# Patient Record
Sex: FEMALE | Race: WHITE | NOT HISPANIC OR LATINO | Employment: OTHER | URBAN - METROPOLITAN AREA
[De-identification: names, ages, dates, MRNs, and addresses within clinical notes are randomized per-mention and may not be internally consistent; named-entity substitution may affect disease eponyms.]

---

## 2019-01-08 ENCOUNTER — TRANSCRIBE ORDERS (OUTPATIENT)
Dept: ADMINISTRATIVE | Facility: HOSPITAL | Age: 74
End: 2019-01-08

## 2019-01-08 DIAGNOSIS — D41.02 NEOPLASM OF UNCERTAIN BEHAVIOR OF LEFT KIDNEY: Primary | ICD-10-CM

## 2021-12-14 ENCOUNTER — APPOINTMENT (OUTPATIENT)
Dept: RADIOLOGY | Facility: AMBULARY SURGERY CENTER | Age: 76
End: 2021-12-14
Attending: ORTHOPAEDIC SURGERY
Payer: COMMERCIAL

## 2021-12-14 ENCOUNTER — OFFICE VISIT (OUTPATIENT)
Dept: OBGYN CLINIC | Facility: CLINIC | Age: 76
End: 2021-12-14
Payer: COMMERCIAL

## 2021-12-14 VITALS — DIASTOLIC BLOOD PRESSURE: 82 MMHG | HEART RATE: 76 BPM | SYSTOLIC BLOOD PRESSURE: 151 MMHG

## 2021-12-14 DIAGNOSIS — M25.572 PAIN, JOINT, ANKLE AND FOOT, LEFT: ICD-10-CM

## 2021-12-14 DIAGNOSIS — S82.52XD: Primary | ICD-10-CM

## 2021-12-14 PROCEDURE — 73610 X-RAY EXAM OF ANKLE: CPT

## 2021-12-14 PROCEDURE — 99203 OFFICE O/P NEW LOW 30 MIN: CPT | Performed by: ORTHOPAEDIC SURGERY

## 2021-12-14 RX ORDER — ACETAMINOPHEN 325 MG/1
325 TABLET ORAL EVERY 6 HOURS PRN
COMMUNITY

## 2021-12-14 RX ORDER — TRAMADOL HYDROCHLORIDE 50 MG/1
50 TABLET ORAL EVERY 6 HOURS PRN
COMMUNITY

## 2021-12-14 RX ORDER — NICOTINE 21 MG/24HR
1 PATCH, TRANSDERMAL 24 HOURS TRANSDERMAL EVERY 24 HOURS
COMMUNITY

## 2021-12-14 RX ORDER — LEVOTHYROXINE SODIUM 0.15 MG/1
150 TABLET ORAL DAILY
COMMUNITY

## 2021-12-14 RX ORDER — ATORVASTATIN CALCIUM 40 MG/1
40 TABLET, FILM COATED ORAL DAILY
COMMUNITY

## 2022-01-07 ENCOUNTER — APPOINTMENT (OUTPATIENT)
Dept: RADIOLOGY | Facility: AMBULARY SURGERY CENTER | Age: 77
End: 2022-01-07
Attending: ORTHOPAEDIC SURGERY
Payer: COMMERCIAL

## 2022-01-07 ENCOUNTER — APPOINTMENT (OUTPATIENT)
Dept: RADIOLOGY | Facility: CLINIC | Age: 77
End: 2022-01-07
Payer: COMMERCIAL

## 2022-01-07 ENCOUNTER — OFFICE VISIT (OUTPATIENT)
Dept: OBGYN CLINIC | Facility: CLINIC | Age: 77
End: 2022-01-07
Payer: COMMERCIAL

## 2022-01-07 DIAGNOSIS — S82.52XD: ICD-10-CM

## 2022-01-07 DIAGNOSIS — S82.52XD: Primary | ICD-10-CM

## 2022-01-07 PROCEDURE — 99213 OFFICE O/P EST LOW 20 MIN: CPT | Performed by: ORTHOPAEDIC SURGERY

## 2022-01-07 PROCEDURE — 73610 X-RAY EXAM OF ANKLE: CPT

## 2022-01-07 NOTE — PATIENT INSTRUCTIONS
Then 2 weeks of weightbearing as tolerated in the CAM boot  You may begin weaning your boot and transitioning to a sneaker (1/21/22)  It is important to do this gradually to avoid aggravating the healing process  1  1/21/22, you may come out of the boot into a sneaker for 2 hours  2  1/22/22, you may come out of the boot into a sneaker for 4 hours,  3  The next day, you may come out of the boot into a sneaker for 6 hours  4  Continue this (adding 2 hours per day) as you tolerate  For example, if you do 6 hours out of the boot into a sneaker and your foot swells more than usual at night and it is difficult to control the discomfort, do not advance to 8 hours the next day, stay at 6 hours until you are able to tolerate it  Elevation, Ice and tylenol and staying off of it at night will be important to aide in this transition out of the boot  Swelling and soreness are normal as you begin to do more with the injured leg  No change in Eliquis due to being on it for her pacemaker  May shower, do not soak in a tub/pool/ocean/etc for another 4 weeks  Compression stocking (Knee high, 20-30mm Hg) to be worn at all times while awake  Recommend taking the following supplements: Vitamin D 4000 units per day and Calcium 1200 mg per day  This will help with bone healing

## 2022-01-07 NOTE — PROGRESS NOTES
MARGARITO Patrick  Attending, Orthopaedic Surgery  Foot and [address] [place] [address] FOOT AND ANKLE CLINIC VISIT     Assessment:     Encounter Diagnosis   Name Primary?  Traumatic closed displaced fracture of medial malleolus with routine healing, left Yes            Plan:   · The patient verbalized understanding of exam findings and treatment plan  We engaged in the shared decision-making process and treatment options were discussed at length with the patient  Surgical and conservative management discussed today along with risks and benefits  · Her Xrays are stable  She is not having any pain in the ankle  · Cast removed  · PT to begin immediately  · WBAT in the CAM boot provided today  · She will use the boot for 3 weeks and then wean the boot over the 4th week  Return in about 6 weeks (around 2/18/2022) for left ankle  History of Present Illness:   Chief Complaint:   Chief Complaint   Patient presents with    Left Ankle - Fracture     Huang Lopez is a 64 y o  female who is being seen in follow-up for left ankle fracture, 12/12/21  She had a stroke and rolled her left ankle, inversion injury  She has a stroke in 2020 and has weakness in the left side  When we last saw she we recommended short-leg cast, cast shoe with the use of the walker   Pain has partially improved  Residual pain is localized at medial with minimal radiating and described as sharp and severe  Pain/symptom timing:  Worse during the day when active  Pain/symptom context:  Worse with activites and work  Pain/symptom modifying factors:  Rest makes better, activities make worse  Pain/symptom associated signs/symptoms: none    Prior treatment   · NSAIDsNo   · Injections No   · Bracing/Orthotics Yes    · Physical Therapy No     Orthopedic Surgical History:   See below    Past Medical, Surgical and Social History:  Past Medical History:  has no past medical history on file    Problem List: does not have any pertinent problems on file  Past Surgical History:  has no past surgical history on file  Family History: family history is not on file  Social History:  reports that she has quit smoking  She has never used smokeless tobacco  No history on file for alcohol use and drug use  Current Medications: has a current medication list which includes the following prescription(s): acetaminophen, apixaban, atorvastatin, levothyroxine, nicotine, and tramadol  Allergies: has No Known Allergies  Review of Systems:  General- denies fever/chills  HEENT- denies hearing loss or sore throat  Eyes- denies eye pain or visual disturbances, denies red eyes  Respiratory- denies cough or SOB  Cardio- denies chest pain or palpitations  GI- denies abdominal pain  Endocrine- denies urinary frequency  Urinary- denies pain with urination  Musculoskeletal- Negative except noted above  Skin- denies rashes or wounds  Neurological- denies dizziness or headache  Psychiatric- denies anxiety or difficulty concentrating    Physical Exam:   There were no vitals taken for this visit  General/Constitutional: No apparent distress: well-nourished and well developed  Eyes: normal ocular motion  Lymphatic: No appreciable lymphadenopathy  Respiratory: Non-labored breathing  Vascular: No edema, swelling or tenderness, except as noted in detailed exam   Integumentary: No impressive skin lesions present, except as noted in detailed exam   Neuro: No ataxia or tremors noted  Psych: Normal mood and affect, oriented to person, place and time  Appropriate affect  Musculoskeletal: Normal, except as noted in detailed exam and in HPI      Examination    Left    Gait In wheelchair   Musculoskeletal Tender to palpation at medial malleolus     Skin Normal       Nails Normal    Range of Motion  20 degrees dorsiflexion, 30 degrees plantarflexion  Subtalar motion: normal    Stability Stable    Muscle Strength 5/5 tibialis anterior  5/5 gastrocnemius-soleus  5/5 posterior tibialis  5/5 peroneal/eversion strength  5/5 EHL  5/5 FHL    Neurologic Normal    Sensation Intact to light touch throughout sural, saphenous, superficial peroneal, deep peroneal and medial/lateral plantar nerve distributions  [place]-Dennis 5 07 filament (10g) testing deferred  Cardiovascular Brisk capillary refill < 2 seconds,intact DP and PT pulses    Special Tests None      Imaging Studies:       3 views of the Left ankle were obtained, reviewed and interpreted independently which demonstrate medial malleolus fracture stable without interval displacement  Reviewed by me personally  Scribe Attestation    I,:  Leyda Day am acting as a scribe while in the presence of the attending physician :       I,:  Sudhir Adkins MD personally performed the services described in this documentation    as scribed in my presence :           Thersa Quay Lachman, MD  Foot & Ankle Surgery   Department [address]      I personally performed the service  Thersa Quay Lachman, MD

## 2022-02-25 ENCOUNTER — APPOINTMENT (OUTPATIENT)
Dept: RADIOLOGY | Facility: CLINIC | Age: 77
End: 2022-02-25
Payer: COMMERCIAL

## 2022-02-25 ENCOUNTER — OFFICE VISIT (OUTPATIENT)
Dept: OBGYN CLINIC | Facility: CLINIC | Age: 77
End: 2022-02-25
Payer: COMMERCIAL

## 2022-02-25 VITALS
BODY MASS INDEX: 29.44 KG/M2 | HEIGHT: 62 IN | WEIGHT: 160 LBS | SYSTOLIC BLOOD PRESSURE: 130 MMHG | DIASTOLIC BLOOD PRESSURE: 82 MMHG

## 2022-02-25 DIAGNOSIS — S82.52XD: ICD-10-CM

## 2022-02-25 DIAGNOSIS — S82.52XD: Primary | ICD-10-CM

## 2022-02-25 PROCEDURE — 73610 X-RAY EXAM OF ANKLE: CPT

## 2022-02-25 PROCEDURE — 99213 OFFICE O/P EST LOW 20 MIN: CPT | Performed by: ORTHOPAEDIC SURGERY

## 2022-02-25 NOTE — PATIENT INSTRUCTIONS
MARGARITO Mejia  Attending, Orthopaedic Surgery  Foot and [address] [facility] [address] [facility]      ORTHOPAEDIC FOOT AND ANKLE CLINIC VISIT     Assessment:     Encounter Diagnosis   Name Primary?  Traumatic closed displaced fracture of medial malleolus with routine healing, left Yes            Plan:   · The patient verbalized understanding of exam findings and treatment plan  We engaged in the shared decision-making process and treatment options were discussed at length with the patient  Surgical and conservative management discussed today along with risks and benefits  · Her fracture has healed on Xray  · No restrictions from an orthopaedic standpoint  · No further imaging needed  She is out of the boot and safely into a sneaker at this point  · Continue PT until discharged by the physical therapist   Jr Rowley Return to clinic PRN      History of Present Illness:   Chief Complaint: s/p above injury    Lucy Gomez is a 64 y o  female who is being seen in follow-up for left ankle traumatic closed displaced fracture of medial malleolus 12/12/21  Last appt transitioned into CAM boot WBAT  Weaned out of the boot starting 1/21  Pain has improved  Denies any numbness or tingling in foot  Pain/symptom timing:  Worse during the day when active  Pain/symptom context:  Worse with activites and work  Pain/symptom modifying factors:  Rest makes better, activities make worse  Pain/symptom associated signs/symptoms: none    Prior treatment   · NSAIDsYes   · Injections No   · Bracing/Orthotics Yes    · Physical Therapy No     Orthopedic Surgical History:   See below     Past Medical, Surgical and Social History:  Past Medical History:  has no past medical history on file  Problem List: does not have any pertinent problems on file  Past Surgical History:  has no past surgical history on file  Family History: family history is not on file  Social History:  reports that she has quit smoking   She has never used smokeless tobacco  No history on file for alcohol use and drug use  Current Medications: has a current medication list which includes the following prescription(s): acetaminophen, apixaban, atorvastatin, levothyroxine, nicotine, and tramadol  Allergies: has No Known Allergies  Review of Systems:  General- denies fever/chills  HEENT- denies hearing loss or sore throat  Eyes- denies eye pain or visual disturbances, denies red eyes  Respiratory- denies cough or SOB  Cardio- denies chest pain or palpitations  GI- denies abdominal pain  Endocrine- denies urinary frequency  Urinary- denies pain with urination  Musculoskeletal- Negative except noted above  Skin- denies rashes or wounds  Neurological- denies dizziness or headache  Psychiatric- denies anxiety or difficulty concentrating    Physical Exam:   /82   Ht 5' 2" (1 575 m)   Wt 72 6 kg (160 lb)   BMI 29 26 kg/m²   General/Constitutional: No apparent distress: well-nourished and well developed  Eyes: normal ocular motion  Lymphatic: No appreciable lymphadenopathy  Respiratory: Non-labored breathing  Vascular: No edema, swelling or tenderness, except as noted in detailed exam   Integumentary: No impressive skin lesions present, except as noted in detailed exam   Neuro: No ataxia or tremors noted  Psych: Normal mood and affect, oriented to person, place and time  Appropriate affect  Musculoskeletal: Normal, except as noted in detailed exam and in HPI  Examination    Left    Gait Antalgic    Musculoskeletal Tender to palpation at medial malleolus     Skin Normal      Nails Normal    Range of Motion  20 degrees dorsiflexion, 40 degrees plantarflexion  Subtalar motion: normal    Stability Stable    Muscle Strength Not assessed due to stroke    Neurologic Normal    Sensation Intact to light touch throughout sural, saphenous, superficial peroneal, deep peroneal and medial/lateral plantar nerve distributions    [place]-Dennis 5 07 filament (10g) testing deferred  Cardiovascular Brisk capillary refill < 2 seconds,intact DP and PT pulses    Special Tests None      Imaging Studies:   3 views of the Left ankle were obtained, reviewed and interpreted independently which demonstrate medial malleolus fracture well healed in an anatomic position  Reviewed by me personally  Mona Smolder Lachman, MD  Foot & Ankle Surgery   Department of [address] [place] [address]      I personally performed the service  Mona Smolder Lachman, MD

## 2022-02-25 NOTE — PROGRESS NOTES
MARGARITO Sanchez  Attending, Orthopaedic Surgery  Foot and [address] [facility] [address] [facility]      ORTHOPAEDIC FOOT AND ANKLE CLINIC VISIT     Assessment:     Encounter Diagnosis   Name Primary?  Traumatic closed displaced fracture of medial malleolus with routine healing, left Yes            Plan:   · The patient verbalized understanding of exam findings and treatment plan  We engaged in the shared decision-making process and treatment options were discussed at length with the patient  Surgical and conservative management discussed today along with risks and benefits  · Her fracture has healed on Xray  · No restrictions from an orthopaedic standpoint  · No further imaging needed  She is out of the boot and safely into a sneaker at this point  · Continue PT until discharged by the physical therapist   Return to clinic PRN      History of Present Illness:   Chief Complaint: s/p above injury    Mary Nick is a 64 y o  female who is being seen in follow-up for left ankle traumatic closed displaced fracture of medial malleolus 12/12/21  Last appt transitioned into CAM boot WBAT  Weaned out of the boot starting 1/21  Pain has improved  Denies any numbness or tingling in foot  Pain/symptom timing:  Worse during the day when active  Pain/symptom context:  Worse with activites and work  Pain/symptom modifying factors:  Rest makes better, activities make worse  Pain/symptom associated signs/symptoms: none    Prior treatment   · NSAIDsYes   · Injections No   · Bracing/Orthotics Yes    · Physical Therapy No     Orthopedic Surgical History:   See below     Past Medical, Surgical and Social History:  Past Medical History:  has no past medical history on file  Problem List: does not have any pertinent problems on file  Past Surgical History:  has no past surgical history on file  Family History: family history is not on file  Social History:  reports that she has quit smoking   She has never used smokeless tobacco  No history on file for alcohol use and drug use  Current Medications: has a current medication list which includes the following prescription(s): acetaminophen, apixaban, atorvastatin, levothyroxine, nicotine, and tramadol  Allergies: has No Known Allergies  Review of Systems:  General- denies fever/chills  HEENT- denies hearing loss or sore throat  Eyes- denies eye pain or visual disturbances, denies red eyes  Respiratory- denies cough or SOB  Cardio- denies chest pain or palpitations  GI- denies abdominal pain  Endocrine- denies urinary frequency  Urinary- denies pain with urination  Musculoskeletal- Negative except noted above  Skin- denies rashes or wounds  Neurological- denies dizziness or headache  Psychiatric- denies anxiety or difficulty concentrating    Physical Exam:   /82   Ht 5' 2" (1 575 m)   Wt 72 6 kg (160 lb)   BMI 29 26 kg/m²   General/Constitutional: No apparent distress: well-nourished and well developed  Eyes: normal ocular motion  Lymphatic: No appreciable lymphadenopathy  Respiratory: Non-labored breathing  Vascular: No edema, swelling or tenderness, except as noted in detailed exam   Integumentary: No impressive skin lesions present, except as noted in detailed exam   Neuro: No ataxia or tremors noted  Psych: Normal mood and affect, oriented to person, place and time  Appropriate affect  Musculoskeletal: Normal, except as noted in detailed exam and in HPI  Examination    Left    Gait Antalgic    Musculoskeletal Tender to palpation at medial malleolus     Skin Normal      Nails Normal    Range of Motion  20 degrees dorsiflexion, 40 degrees plantarflexion  Subtalar motion: normal    Stability Stable    Muscle Strength Not assessed due to stroke    Neurologic Normal    Sensation Intact to light touch throughout sural, saphenous, superficial peroneal, deep peroneal and medial/lateral plantar nerve distributions    [place]-Dennis 5 07 filament (10g) testing deferred  Cardiovascular Brisk capillary refill < 2 seconds,intact DP and PT pulses    Special Tests None      Imaging Studies:   3 views of the Left ankle were obtained, reviewed and interpreted independently which demonstrate medial malleolus fracture well healed in an anatomic position  Reviewed by me personally  Jay Rower Lachman, MD  Foot & Ankle Surgery   Department of [address] [place] [address]      I personally performed the service  Jay Rower Lachman, MD      Scribe Attestation    I,:   am acting as a scribe while in the presence of the attending physician :       I,:   personally performed the services described in this documentation    as scribed in my presence :

## 2022-03-10 ENCOUNTER — APPOINTMENT (EMERGENCY)
Dept: RADIOLOGY | Facility: HOSPITAL | Age: 77
End: 2022-03-10
Payer: COMMERCIAL

## 2022-03-10 ENCOUNTER — HOSPITAL ENCOUNTER (EMERGENCY)
Facility: HOSPITAL | Age: 77
Discharge: HOME | End: 2022-03-11
Attending: SURGERY | Admitting: SURGERY
Payer: COMMERCIAL

## 2022-03-10 VITALS
SYSTOLIC BLOOD PRESSURE: 203 MMHG | RESPIRATION RATE: 20 BRPM | HEART RATE: 72 BPM | DIASTOLIC BLOOD PRESSURE: 90 MMHG | TEMPERATURE: 97.8 F | OXYGEN SATURATION: 98 % | WEIGHT: 165.34 LBS
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
## 2022-03-10 DIAGNOSIS — S00.03XA HEMATOMA OF SCALP, INITIAL ENCOUNTER: Primary | ICD-10-CM

## 2022-03-10 LAB
ALBUMIN SERPL BCP-MCNC: 3.3 G/DL (ref 3.5–5)
ALP SERPL-CCNC: 113 U/L (ref 46–116)
ALT SERPL W P-5'-P-CCNC: 17 U/L (ref 12–78)
ANION GAP SERPL CALCULATED.3IONS-SCNC: 8 MMOL/L (ref 4–13)
APTT PPP: 33 SECONDS (ref 23–37)
AST SERPL W P-5'-P-CCNC: 16 U/L (ref 5–45)
BASE EXCESS BLDA CALC-SCNC: 1 MMOL/L (ref -2–3)
BASOPHILS # BLD AUTO: 0.04 THOUSANDS/ΜL (ref 0–0.1)
BASOPHILS NFR BLD AUTO: 0 % (ref 0–1)
BILIRUB SERPL-MCNC: 0.32 MG/DL (ref 0.2–1)
BUN SERPL-MCNC: 22 MG/DL (ref 5–25)
CA-I BLD-SCNC: 1.24 MMOL/L (ref 1.12–1.32)
CALCIUM ALBUM COR SERPL-MCNC: 10.9 MG/DL (ref 8.3–10.1)
CALCIUM SERPL-MCNC: 10.3 MG/DL (ref 8.3–10.1)
CHLORIDE SERPL-SCNC: 111 MMOL/L (ref 100–108)
CO2 SERPL-SCNC: 22 MMOL/L (ref 21–32)
CREAT SERPL-MCNC: 1.17 MG/DL (ref 0.6–1.3)
EOSINOPHIL # BLD AUTO: 0.16 THOUSAND/ΜL (ref 0–0.61)
EOSINOPHIL NFR BLD AUTO: 2 % (ref 0–6)
ERYTHROCYTE [DISTWIDTH] IN BLOOD BY AUTOMATED COUNT: 14.6 % (ref 11.6–15.1)
GFR SERPL CREATININE-BSD FRML MDRD: 44 ML/MIN/1.73SQ M
GLUCOSE SERPL-MCNC: 107 MG/DL (ref 65–140)
GLUCOSE SERPL-MCNC: 107 MG/DL (ref 65–140)
HCO3 BLDA-SCNC: 25.7 MMOL/L (ref 24–30)
HCT VFR BLD AUTO: 42.2 % (ref 34.8–46.1)
HCT VFR BLD CALC: 42 % (ref 34.8–46.1)
HGB BLD-MCNC: 13.9 G/DL (ref 11.5–15.4)
HGB BLDA-MCNC: 14.3 G/DL (ref 11.5–15.4)
HOLD SPECIMEN: NORMAL
IMM GRANULOCYTES # BLD AUTO: 0.03 THOUSAND/UL (ref 0–0.2)
IMM GRANULOCYTES NFR BLD AUTO: 0 % (ref 0–2)
INR PPP: 1.18 (ref 0.84–1.19)
LYMPHOCYTES # BLD AUTO: 2.94 THOUSANDS/ΜL (ref 0.6–4.47)
LYMPHOCYTES NFR BLD AUTO: 32 % (ref 14–44)
MCH RBC QN AUTO: 29 PG (ref 26.8–34.3)
MCHC RBC AUTO-ENTMCNC: 32.9 G/DL (ref 31.4–37.4)
MCV RBC AUTO: 88 FL (ref 82–98)
MONOCYTES # BLD AUTO: 0.84 THOUSAND/ΜL (ref 0.17–1.22)
MONOCYTES NFR BLD AUTO: 9 % (ref 4–12)
NEUTROPHILS # BLD AUTO: 5.29 THOUSANDS/ΜL (ref 1.85–7.62)
NEUTS SEG NFR BLD AUTO: 57 % (ref 43–75)
NRBC BLD AUTO-RTO: 0 /100 WBCS
PCO2 BLD: 27 MMOL/L (ref 21–32)
PCO2 BLD: 39.1 MM HG (ref 42–50)
PH BLD: 7.43 [PH] (ref 7.3–7.4)
PLATELET # BLD AUTO: 221 THOUSANDS/UL (ref 149–390)
PMV BLD AUTO: 10.8 FL (ref 8.9–12.7)
PO2 BLD: 40 MM HG (ref 35–45)
POTASSIUM BLD-SCNC: 4.6 MMOL/L (ref 3.5–5.3)
POTASSIUM SERPL-SCNC: 4.3 MMOL/L (ref 3.5–5.3)
PROT SERPL-MCNC: 6.7 G/DL (ref 6.4–8.2)
PROTHROMBIN TIME: 14.5 SECONDS (ref 11.6–14.5)
RBC # BLD AUTO: 4.79 MILLION/UL (ref 3.81–5.12)
SAO2 % BLD FROM PO2: 76 % (ref 60–85)
SODIUM BLD-SCNC: 142 MMOL/L (ref 136–145)
SODIUM SERPL-SCNC: 141 MMOL/L (ref 136–145)
SPECIMEN SOURCE: ABNORMAL
WBC # BLD AUTO: 9.3 THOUSAND/UL (ref 4.31–10.16)

## 2022-03-10 PROCEDURE — 84295 ASSAY OF SERUM SODIUM: CPT

## 2022-03-10 PROCEDURE — 99284 EMERGENCY DEPT VISIT MOD MDM: CPT | Performed by: SURGERY

## 2022-03-10 PROCEDURE — 99284 EMERGENCY DEPT VISIT MOD MDM: CPT

## 2022-03-10 PROCEDURE — 82330 ASSAY OF CALCIUM: CPT

## 2022-03-10 PROCEDURE — 71045 X-RAY EXAM CHEST 1 VIEW: CPT

## 2022-03-10 PROCEDURE — 82947 ASSAY GLUCOSE BLOOD QUANT: CPT

## 2022-03-10 PROCEDURE — 85610 PROTHROMBIN TIME: CPT | Performed by: EMERGENCY MEDICINE

## 2022-03-10 PROCEDURE — 72125 CT NECK SPINE W/O DYE: CPT

## 2022-03-10 PROCEDURE — 85014 HEMATOCRIT: CPT

## 2022-03-10 PROCEDURE — 80053 COMPREHEN METABOLIC PANEL: CPT | Performed by: EMERGENCY MEDICINE

## 2022-03-10 PROCEDURE — 70450 CT HEAD/BRAIN W/O DYE: CPT

## 2022-03-10 PROCEDURE — 85730 THROMBOPLASTIN TIME PARTIAL: CPT | Performed by: EMERGENCY MEDICINE

## 2022-03-10 PROCEDURE — 82803 BLOOD GASES ANY COMBINATION: CPT

## 2022-03-10 PROCEDURE — 36415 COLL VENOUS BLD VENIPUNCTURE: CPT

## 2022-03-10 PROCEDURE — NC001 PR NO CHARGE: Performed by: EMERGENCY MEDICINE

## 2022-03-10 PROCEDURE — 84132 ASSAY OF SERUM POTASSIUM: CPT

## 2022-03-10 PROCEDURE — 85025 COMPLETE CBC W/AUTO DIFF WBC: CPT | Performed by: EMERGENCY MEDICINE

## 2022-03-10 PROCEDURE — 76705 ECHO EXAM OF ABDOMEN: CPT | Performed by: SURGERY

## 2022-03-10 PROCEDURE — 93308 TTE F-UP OR LMTD: CPT | Performed by: SURGERY

## 2022-03-10 NOTE — DISCHARGE INSTRUCTIONS
You may apply ice to the bruise as needed to help reduce swelling  You may take tylenol as needed for headaches or other pain  Follow up with your PCP as needed  Return to the emergency department for any new or worsening symptoms such as repeat seizures, mental status change, new speech difficulties, or difficulty walking

## 2022-03-10 NOTE — PROGRESS NOTES
Pastoral Care Progress Note    3/10/2022  Patient: Hannah Fagan :   Admission Date & Time: 3/10/2022 1633  MRN: [id number] [facility]: [id number]                     Chaplaincy Interventions Utilized:   Collaboration: Consulted with interdisciplinary team    Chaplaincy Outcomes Achieved:  Declined  support and Unknown outcome    Spiritual Coping Strategies Utilized:   No spiritual coping       03/10/22 1800   Psychosocial   Patient Behaviors/Mood Appropriate for situation   Plan of Care   Comments Jule Baumgarten came in through trauma (Lv B ground) as the result of a fall at her care facility  She had a seizure and fell out of her wheel chair  Upon arrival she was uncooperative and very difficult  She has a hematoma on her forehead, but the CT of her head and neck showed no brain bleed nor injury to her neck

## 2022-03-10 NOTE — PROCEDURES
POC FAST US    Date/Time: 3/10/2022 4:53 PM  Performed by: aPllavi Florence DO  Authorized by: Pallavi Florence DO     Patient location:  Trauma  Other Assisting Provider: No    Procedure details:     Exam Type:  Diagnostic and educational    Indications: blunt abdominal trauma      Assess for:  Intra-abdominal fluid and pericardial effusion    Technique: FAST      Views obtained:  Heart - Pericardial sac, RUQ - Miller's Pouch, LUQ - Splenorenal space and Suprapubic - Pouch of Nir    Image quality: diagnostic      Image availability:  Images available in PACS  FAST Findings:     RUQ (Hepatorenal) free fluid: absent      LUQ (Splenorenal) free fluid: absent      Suprapubic free fluid: absent      Cardiac wall motion: identified      Pericardial effusion: absent    Interpretation:     Impressions: negative

## 2022-03-10 NOTE — H&P
H&P Exam - Trauma   Lucy Gomez 64 y o  female MRN: [id number]  Unit/Bed#: ED 26 Encounter: [id number]    Trauma Assessment and Plan:  No acute injuries, discharge back to facility    Assessment/Plan   Trauma Alert: Level B  Model of Arrival: Ambulance  Trauma Team: Attending [place] and [facility]  Consultants:     None    Trauma Active Problems:   Fall  Scalp hematoma    Trauma Plan:   - CT head and cervical spine negative for acute injury  - labs WNL  - discharge back to nursing facility  - outpatient instructions to use ice packs and tylenol as needed    Chief Complaint: none    History of Present Illness   HPI:  Lucy Gomez is a 64 y o  female who presents from a nursing facility after a fall  Patient reportedly had a seizure 1 month ago and was told to go on anticonvulsant medications at that time but refused  Patient reported to have a seizure today, causing her to fall out of her wheelchair  Patient fell onto her head on hard black  Patient does take eliquis  Patient with a hematoma to the right eyebrow  Otherwise no reported injuries  Patient currently denies any pain anywhere  Mechanism:seizure leading to fall out of wheelchair with headstrike    Review of Systems   Constitutional: Negative  HENT: Negative  Respiratory: Negative  Cardiovascular: Negative  Gastrointestinal: Negative  Genitourinary: Negative  Musculoskeletal: Negative for arthralgias, back pain and neck pain  Skin: Positive for wound  Neurological: Positive for seizures  All other systems reviewed and are negative  Historical Information   History is unobtainable from the patient due to lack of cooperation and confusion    Efforts to obtain history included the following sources: other medical personnel, obtained from other records    Past Medical History:   - CVA  - left foot drop  - hypothyroidism  - hyperlipidemia  - HTN  - a-fib  - dysphagia    Past Surgical History: No past surgical history on file  Meds/Allergies   (Not in a hospital admission)   - Patient states that she only takes her eliquis and levothyroxine, denies any other medications    NKDA    PHYSICAL EXAM    PE limited by: NA    Objective   Vitals:   First set: Temperature: (!) 96 9 °F (36 1 °C) (03/10/22 1645)  Pulse: 72 (03/10/22 1645)  Respirations: 18 (03/10/22 1645)  Blood Pressure: 146/99 (03/10/22 1645)    Primary Survey:   (A) Airway: intact  (B) Breathing: BL breath sounds, no respiratory distress  (C) Circulation: Pulses:   normal  (D) Disabliity:  GCS Total:  14, Eye Opening:   Spontaneous = 4, Motor Response: Obeys commands = 6 and Verbal Response:  Confused = 4  (E) Expose:  Completed    Secondary Survey: (Click on Physical Exam tab above)  Physical Exam  Vitals and nursing note reviewed  Constitutional:       General: She is awake  She is not in acute distress  Appearance: She is not toxic-appearing  HENT:      Head: Normocephalic  No raccoon eyes, abrasion or laceration  Mouth/Throat:      Lips: Pink  Mouth: Mucous membranes are moist    Eyes:      General: Vision grossly intact  Gaze aligned appropriately  Extraocular Movements: Extraocular movements intact  Conjunctiva/sclera: Conjunctivae normal       Pupils: Pupils are equal, round, and reactive to light  Cardiovascular:      Rate and Rhythm: Normal rate and regular rhythm  Heart sounds: Normal heart sounds  Pulmonary:      Effort: Pulmonary effort is normal  No respiratory distress  Breath sounds: Normal breath sounds  Abdominal:      General: There is no distension  Palpations: Abdomen is soft  Tenderness: There is no abdominal tenderness  Musculoskeletal:      Cervical back: Full passive range of motion without pain and neck supple  No deformity or bony tenderness  No spinous process tenderness  Thoracic back: No deformity or bony tenderness  Lumbar back: No deformity or bony tenderness  Comments: Full ROM of all 4 extremities, no obvious deformities   Skin:     General: Skin is warm and dry  Comments: Hematoma of right eyebrow, otherwise no noticeable bruises, lacerations, or wounds   Neurological:      General: No focal deficit present  Mental Status: She is alert  She is confused  Comments: Patient refuses to tell us her name or the year  Knows that she is at [facility]  No focal deficits on exam     Psychiatric:         Mood and Affect: Affect is angry  Behavior: Behavior is agitated  Invasive Devices  Report    Peripheral Intravenous Line            Peripheral IV 03/10/22 Right Forearm <1 day                Lab Results:   BMP/CMP:   Lab Results   Component Value Date    SODIUM 141 03/10/2022    K 4 3 03/10/2022     (H) 03/10/2022    CO2 22 03/10/2022    CO2 27 03/10/2022    BUN 22 03/10/2022    CREATININE 1 17 03/10/2022    GLUCOSE 107 03/10/2022    CALCIUM 10 3 (H) 03/10/2022    AST 16 03/10/2022    ALT 17 03/10/2022    ALKPHOS 113 03/10/2022    EGFR 44 03/10/2022   , CBC:   Lab Results   Component Value Date    WBC 9 30 03/10/2022    HGB 13 9 03/10/2022    HCT 42 2 03/10/2022    MCV 88 03/10/2022     03/10/2022    MCH 29 0 03/10/2022    MCHC 32 9 03/10/2022    RDW 14 6 03/10/2022    MPV 10 8 03/10/2022    NRBC 0 03/10/2022   , Coagulation:   Lab Results   Component Value Date    INR 1 18 03/10/2022    and ISTAT: No components found for: VBG  Imaging/EKG Studies: negative for acute findings   TRAUMA - CT head wo contrast   Final Result by Radha Reyes MD (03/10 1728)      No intracranial hemorrhage or calvarial fracture  Right frontal/supraorbital scalp swelling and scalp hematoma                  Workstation performed: [id number]         TRAUMA - CT spine cervical wo contrast   Final Result by Radha Reyes MD (03/10 1730)      No cervical spine fracture or traumatic malalignment                     Workstation performed: [id number]         XR trauma multiple    (Results Pending)   XR chest 1 view    (Results Pending)       Other Studies: NA    Code Status: No Order  Advance Directive and Living Will:      Power of :    POLST:      Counseling / Coordination of Care  Total floor / unit time spent today 18 minutes  This involved direct patient contact where I performed a full history and physical, reviewed previous records, and reviewed laboratory and other diagnostic studies

## 2022-03-10 NOTE — ED PROVIDER NOTES
Emergency Department Airway Evaluation and Management Form    History  Obtained from:  EMS      Chief Complaint:  Trauma Alert    HPI: Pt is a 64 y o  female presents from nursing facility  Patient witnessed to be sitting in a chair, had a seizure falling forward, lasting few minutes then resolving  Patient reportedly back to her baseline  Patient on Eliquis  Patient herself says she feels fine has no complaints  Patient refused C-collar  Reportedly had a seizure last month, recommended to start on antiepileptic medication but patient refused      I have reviewed and agree with the history as documented      Allergies  Allergies: see nurses notes    Physical Exam    Vitals:    03/10/22 1645   BP: 146/99   Pulse: 72   Resp: 18   Temp: (!) 96 9 °F (36 1 °C)   SpO2: 99%     Supplemental Oxygen:  None    GCS:  14    Neuro: Alert and oriented person, place and time  Psych: not combative, not anxious, cooperative for exam  Neck:  No JVD, No midline tenderness  Respiratory:  Clear to auscultation  Mouth:  No signs of trauma  Pharynx:  Patent        ED Medications given  See nursing notes    Intubation    No intubation required    Final Diagnosis:  Acute seizure, acute closed head injury, chronic anticoagulation    ED Provider  Electronically Signed by       Mik Nino DO  03/10/22 9947

## 2022-05-20 ENCOUNTER — TELEPHONE (OUTPATIENT)
Dept: NEUROLOGY | Facility: CLINIC | Age: 77
End: 2022-05-20

## 2022-05-20 NOTE — TELEPHONE ENCOUNTER
Called patient's preferred number which is the facility she is a resident at and spoke with aMgui Hopkins and confirm appointment for 6-2-22 at 2 pm with Dr Alberto Mcgowan in [place] and she accepted

## 2022-05-20 NOTE — TELEPHONE ENCOUNTER
Patient care facility calling to schedule new patient appointment for seizures that have occurred at their facility  No testing done  Triage intake sent

## 2022-06-02 ENCOUNTER — OFFICE VISIT (OUTPATIENT)
Dept: NEUROLOGY | Facility: CLINIC | Age: 77
End: 2022-06-02
Payer: COMMERCIAL

## 2022-06-02 VITALS — DIASTOLIC BLOOD PRESSURE: 92 MMHG | SYSTOLIC BLOOD PRESSURE: 196 MMHG | HEART RATE: 70 BPM

## 2022-06-02 DIAGNOSIS — R56.9 SEIZURE (HCC): Primary | ICD-10-CM

## 2022-06-02 PROCEDURE — 99205 OFFICE O/P NEW HI 60 MIN: CPT | Performed by: STUDENT IN AN ORGANIZED HEALTH CARE EDUCATION/TRAINING PROGRAM

## 2022-06-02 RX ORDER — LEVOTHYROXINE SODIUM 0.07 MG/1
TABLET ORAL
COMMUNITY
Start: 2022-05-03

## 2022-06-02 RX ORDER — CHOLECALCIFEROL (VITAMIN D3) 125 MCG
CAPSULE ORAL
COMMUNITY
Start: 2022-05-26

## 2022-06-02 RX ORDER — LEVETIRACETAM 500 MG/1
500 TABLET ORAL EVERY 12 HOURS SCHEDULED
Start: 2022-06-02 | End: 2022-06-16

## 2022-06-02 RX ORDER — SODIUM PHOSPHATE, DIBASIC AND SODIUM PHOSPHATE, MONOBASIC 7; 19 G/133ML; G/133ML
1 ENEMA RECTAL
COMMUNITY

## 2022-06-02 RX ORDER — LOVASTATIN 20 MG/1
TABLET ORAL
COMMUNITY
Start: 2022-05-11

## 2022-06-02 RX ORDER — DIAPER,BRIEF,INFANT-TODD,DISP
EACH MISCELLANEOUS
COMMUNITY
Start: 2022-05-23

## 2022-06-02 RX ORDER — SENNA PLUS 8.6 MG/1
1 TABLET ORAL DAILY
COMMUNITY

## 2022-06-02 RX ORDER — ANTACID TABLETS 500 MG/1
TABLET, CHEWABLE ORAL
COMMUNITY
Start: 2022-04-16

## 2022-06-02 NOTE — PROGRESS NOTES
[facility] [address] [facility] [address] [place] [address]  Initial Consultation    Impression/Plan    Francesca Martin is a 64 y o  right handed female with a PMH of ischemic stroke with residual left sided weakness, HLD, HTN, afib on eliquis and hypothyroidism presenting to the [facility] [address] [facility] for evaluation of seizures  Her neurological exam is consistent with a chronic right subcortical stroke  While the patient is not a great historian there is enough documentation in the EMR that I think she has had two epileptic seizures in the last 6 months, presumed from her old stroke  A lot of time was spent discussing the value of an AED  After some time she accepted that she probably had a seizure on 3/10/2022  She didn't want to take depakote but was OK with Keppra  She didn't want any additional testing like EEG or MRI  Patient aware of risk of seizures including death  Plan outlined below:    #Symptomatic epilepsy  - Keppra 500 BID  - Defer MRI and EEG based on patient choice  - Follow up in 6 months or sooner if needed  We discussed the pathophysiology of epilepsy/seizure and seizure safety/precautions  We discussed factors that can lower seizure threshold and the side effects of antiepileptic medications  Diagnoses and all orders for this visit:    Seizure ([facility] [address] )  -     levETIRAcetam (Keppra) 500 mg tablet; Take 1 tablet (500 mg total) by mouth every 12 (twelve) hours    Other orders  -     lovastatin (MEVACOR) 20 mg tablet  -     levothyroxine 75 mcg tablet  -     hydrocortisone 1 % cream  -     Cholecalciferol (Vitamin D3) 50 MCG (2000 UT) TABS  -     Eddie-Gest Antacid 500 MG chewable tablet  -     sodium phosphate-biphosphate (FLEET) 7-19 g 118 mL enema; Insert 1 enema into the rectum  -     Magnesium Hydroxide (DULCOLAX PO); Take 10 mg by mouth  -     magnesium hydroxide (MILK OF MAGNESIA) 400 mg/5 mL oral suspension;  Take by mouth daily as needed for constipation  -     senna (SENOKOT) 8 6 MG tablet; Take 1 tablet by mouth daily        Evie Hernandez is a 64 y o  right handed female with a PMH of ischemic stroke with residual left sided weakness, HLD, HTN, afib on eliquis and hypothyroidism presenting to the [facility] [address] [facility] for evaluation of seizures  She had a stroke in 2020/2019 with residual left sided weakness  Per chart review, in 2/2022 vs 12/7/2021? the patient had a seizure and it was recommended that she start an AED but refused  However the patient cannot confirm this as she is not a reliable historian  On 3/10/2022 the patient presented to the ED after seizure-like activity  Per chart review, she was witnessed to be sitting in a chair, then fell forward with seizure like activity lasting few minutes then resolving  By the time she was seen in the ED the patient was reportedly back to her baseline  No AED was started as she only takes her eliquis and levothyroxine  CTH was remarkable only for a scalp hematoma     She was discharged hemodynamically stable and in now acute distress  Today she is here in the office and isn't a reliable historian  She denies ever having a seizure before  She says that she remembers the day that she feel out of the wheelchair but doesn't remember anything odd before she fell or anything concerning for seizure  Upon further questioning she states that she remembers being told at the time that it was a seizure but she didn't want to take any medication  Lastly, she says even if it was a seizure then what are we going to do about it? She says she was "never sick"  She also reports that doesn't care if she has any more seizures and that if she dies she dies  She stays at [facility]  She has been there three years, ever since her first stroke   Paperwork from her home states that she has been tried on Keppra and Depakote but not further insight about this is available at the time of the appointment  She is wheelchair dependent but she can transfer without support  Special Features  Age/Date of seizure onset: 12/7/2021? Status epilepticus: no  Self Injury Seizures: yes - Head laceration  Precipitating Factors: N/A    Epilepsy Risk Factors:  Stroke    Current AEDs:  None    Prior AEDs:  None    Prior Evaluation:  CTH 3/10/2022: No intracranial hemorrhage    History Reviewed: The following were reviewed and updated as appropriate: allergies, current medications, past family history, past medical history, past social history, past surgical history and problem list     Psychiatric History:  Denies any mental health    Social History:   Driving: No  Lives Alone: No  Occupation: retired    ROS:  Constitutional: Negative  Negative for appetite change and fever  HENT: Positive for tinnitus  Negative for hearing loss, trouble swallowing and voice change  Eyes: Negative  Negative for photophobia and pain  Respiratory: Negative  Negative for shortness of breath  Cardiovascular: Negative  Negative for palpitations  Gastrointestinal: Negative  Negative for nausea and vomiting  Endocrine: Negative  Negative for cold intolerance  Genitourinary: Negative  Negative for dysuria, frequency and urgency  Musculoskeletal: Negative  Negative for myalgias and neck pain  Skin: Negative  Negative for rash  Neurological: Positive for seizures (12/7/21 and 3/10/22)  Negative for dizziness, tremors, syncope, facial asymmetry, speech difficulty, weakness, light-headedness, numbness and headaches  Head pressure    Hematological: Negative  Does not bruise/bleed easily  Psychiatric/Behavioral: Negative  Negative for confusion, hallucinations and sleep disturbance  All other systems reviewed and are negative        Objective    BP (!) 196/92 (BP Location: Right arm, Patient Position: Sitting, Cuff Size: Adult)   Pulse 70      General Exam  General: well developed, no acute distress  HEENT: mucous membranes moist, anicteric sclera  Neck: supple, good ROM  Extremities: no clubbing, cyanosis or edema  Skin: no rash on visible skin  Neurological Exam  Mental Status: awake, alert, and fully oriented to person, place, time, and situation  Attention  Intact  Fund of knowledge is appropriate for age and education  There is no neglect  Circumferential though process and highly distractible  Language: fluency, and comprehension normal        Cranial Nerves: Pupils equal and reactive to light  Visual fields full to confrontation  Extraocular motions intact with full versions, normal pursuits and saccades  Facial strength full and symmetric  Facial sensation intact in V1-V3  Hearing intact to voice  Tongue protrudes to midline  Palate elevates symmetrically  Speech clear without notable dysarthria  Shoulder shrug activation full and symmetric  Motor: Normal bulk  Slight left arm spasticity  No pronator drift  Strength is 5/5 on the right and 4/5 on the left  No involuntary movements  Sensory: Sensation intact to light touch distally in all extremities  Coordination: Normal finger-to-nose  Normal rapid alternating movements  Station and gait: Deferred    Reflexes: Reflexes 2+ throughout and symmetric       La Garcia MD   [facility]note 3[facility]  [facility] [address] [facility]

## 2022-06-02 NOTE — PROGRESS NOTES
Review of Systems   Constitutional: Negative  Negative for appetite change and fever  HENT: Positive for tinnitus  Negative for hearing loss, trouble swallowing and voice change  Eyes: Negative  Negative for photophobia and pain  Respiratory: Negative  Negative for shortness of breath  Cardiovascular: Negative  Negative for palpitations  Gastrointestinal: Negative  Negative for nausea and vomiting  Endocrine: Negative  Negative for cold intolerance  Genitourinary: Negative  Negative for dysuria, frequency and urgency  Musculoskeletal: Negative  Negative for myalgias and neck pain  Skin: Negative  Negative for rash  Neurological: Positive for seizures (12/7/21 and 3/10/22)  Negative for dizziness, tremors, syncope, facial asymmetry, speech difficulty, weakness, light-headedness, numbness and headaches  Head pressure    Hematological: Negative  Does not bruise/bleed easily  Psychiatric/Behavioral: Negative  Negative for confusion, hallucinations and sleep disturbance  All other systems reviewed and are negative

## 2022-06-07 ENCOUNTER — TELEPHONE (OUTPATIENT)
Dept: NEUROLOGY | Facility: CLINIC | Age: 77
End: 2022-06-07

## 2022-06-07 NOTE — TELEPHONE ENCOUNTER
Received call from [facility]  Was made aware that patient has tried/failed Keppra and Depakote with side effects in the past  Patient was ordered Keppra at the last visit and patient tried it for 2 days and is reporting sever headaches and feels she is so sleepy she is going to pass out  Patient does not with to take keppra or Depakote again but is agreeable to trying a different medication       Please advise     Markos#: [phone] [address]

## 2022-06-14 NOTE — TELEPHONE ENCOUNTER
Per Dr Keiry Pierre - "I would then try zonisamide     I would start with 100 mg (1 tab) at night for one week, then 200 mg (2 tab) at night for one week, then 300 mg thereafter  If the patient is willing I can write a prescription  Possible side effects include weight loss and kidney stones "    [facility]  Spoke with Emerald who took down this information   She spoke with patient and patient is agreeable to trying zonisamide

## 2022-06-16 DIAGNOSIS — R56.9 SEIZURE (HCC): Primary | ICD-10-CM

## 2022-06-16 RX ORDER — ZONISAMIDE 100 MG/1
300 CAPSULE ORAL DAILY
Qty: 120 CAPSULE | Refills: 3
Start: 2022-06-16 | End: 2023-06-16

## 2022-06-20 NOTE — TELEPHONE ENCOUNTER
Dr Faina Arriaga - "Sounds good  I put a prescription order in [facility] but couldn't send it anywhere  Do they need a prescription from me? If so then we would need to fax over my order to their facility "    Called and spoke with Emerald  She would like script sent to the facility  Fax#: [phone]    Thank you!

## 2022-06-20 NOTE — TELEPHONE ENCOUNTER
Received vm from [facility]    Patient is reporting drowsiness with zonisamide and they would like to know if it would be okay to change her dose to the PM instead of AM     Please advise

## 2022-06-20 NOTE — TELEPHONE ENCOUNTER
Called [facility] and spoke with Emerald, made her aware medication is okay to be given at night   Emerald verbalized understanding

## 2022-06-20 NOTE — TELEPHONE ENCOUNTER
It looks as if the prior instructions were to give the zonisamide at night, which is the time that we typically dose it  In short, yes, okay to give it at night

## 2022-09-24 ENCOUNTER — HOSPITAL ENCOUNTER (INPATIENT)
Facility: HOSPITAL | Age: 77
LOS: 3 days | Discharge: NURSING, REHAB OR LONG-TERM CARE | DRG: 057 | End: 2022-09-27
Attending: SURGERY | Admitting: FAMILY MEDICINE
Payer: COMMERCIAL

## 2022-09-24 ENCOUNTER — APPOINTMENT (EMERGENCY)
Dept: RADIOLOGY | Facility: HOSPITAL | Age: 77
DRG: 057 | End: 2022-09-24
Payer: COMMERCIAL

## 2022-09-24 ENCOUNTER — APPOINTMENT (OUTPATIENT)
Dept: RADIOLOGY | Facility: HOSPITAL | Age: 77
DRG: 057 | End: 2022-09-24
Payer: COMMERCIAL

## 2022-09-24 DIAGNOSIS — I10 PRIMARY HYPERTENSION: ICD-10-CM

## 2022-09-24 DIAGNOSIS — H01.009 BLEPHARITIS: ICD-10-CM

## 2022-09-24 DIAGNOSIS — R56.9 SEIZURE (HCC): Primary | ICD-10-CM

## 2022-09-24 DIAGNOSIS — B37.9 CANDIDIASIS: ICD-10-CM

## 2022-09-24 DIAGNOSIS — I48.91 ATRIAL FIBRILLATION, UNSPECIFIED TYPE (HCC): ICD-10-CM

## 2022-09-24 DIAGNOSIS — N28.89 KIDNEY MASS: ICD-10-CM

## 2022-09-24 DIAGNOSIS — R41.82 AMS (ALTERED MENTAL STATUS): ICD-10-CM

## 2022-09-24 DIAGNOSIS — I63.9 CVA (CEREBRAL VASCULAR ACCIDENT) (HCC): ICD-10-CM

## 2022-09-24 PROBLEM — Z86.73 HISTORY OF CVA (CEREBROVASCULAR ACCIDENT): Status: ACTIVE | Noted: 2022-09-24

## 2022-09-24 PROBLEM — E03.9 HYPOTHYROIDISM: Status: ACTIVE | Noted: 2022-09-24

## 2022-09-24 PROBLEM — E78.5 HYPERLIPIDEMIA: Status: ACTIVE | Noted: 2022-09-24

## 2022-09-24 LAB
2HR DELTA HS TROPONIN: 2 NG/L
2HR DELTA HS TROPONIN: 2 NG/L
4HR DELTA HS TROPONIN: 0 NG/L
4HR DELTA HS TROPONIN: 0 NG/L
ANION GAP SERPL CALCULATED.3IONS-SCNC: 5 MMOL/L (ref 4–13)
ANION GAP SERPL CALCULATED.3IONS-SCNC: 5 MMOL/L (ref 4–13)
BASE EXCESS BLDA CALC-SCNC: -2 MMOL/L (ref -2–3)
BASE EXCESS BLDA CALC-SCNC: -2 MMOL/L (ref -2–3)
BASOPHILS # BLD AUTO: 0.03 THOUSANDS/ΜL (ref 0–0.1)
BASOPHILS # BLD AUTO: 0.03 THOUSANDS/ΜL (ref 0–0.1)
BASOPHILS NFR BLD AUTO: 0 % (ref 0–1)
BASOPHILS NFR BLD AUTO: 0 % (ref 0–1)
BUN SERPL-MCNC: 24 MG/DL (ref 5–25)
BUN SERPL-MCNC: 24 MG/DL (ref 5–25)
CA-I BLD-SCNC: 1.21 MMOL/L (ref 1.12–1.32)
CA-I BLD-SCNC: 1.21 MMOL/L (ref 1.12–1.32)
CALCIUM SERPL-MCNC: 9.1 MG/DL (ref 8.3–10.1)
CALCIUM SERPL-MCNC: 9.1 MG/DL (ref 8.3–10.1)
CARDIAC TROPONIN I PNL SERPL HS: 13 NG/L
CARDIAC TROPONIN I PNL SERPL HS: 15 NG/L
CARDIAC TROPONIN I PNL SERPL HS: 15 NG/L
CHLORIDE SERPL-SCNC: 113 MMOL/L (ref 96–108)
CHLORIDE SERPL-SCNC: 113 MMOL/L (ref 96–108)
CO2 SERPL-SCNC: 23 MMOL/L (ref 21–32)
CO2 SERPL-SCNC: 23 MMOL/L (ref 21–32)
CREAT SERPL-MCNC: 1.14 MG/DL (ref 0.6–1.3)
CREAT SERPL-MCNC: 1.14 MG/DL (ref 0.6–1.3)
EOSINOPHIL # BLD AUTO: 0.19 THOUSAND/ΜL (ref 0–0.61)
EOSINOPHIL # BLD AUTO: 0.19 THOUSAND/ΜL (ref 0–0.61)
EOSINOPHIL NFR BLD AUTO: 2 % (ref 0–6)
EOSINOPHIL NFR BLD AUTO: 2 % (ref 0–6)
ERYTHROCYTE [DISTWIDTH] IN BLOOD BY AUTOMATED COUNT: 15.3 % (ref 11.6–15.1)
ERYTHROCYTE [DISTWIDTH] IN BLOOD BY AUTOMATED COUNT: 15.3 % (ref 11.6–15.1)
GFR SERPL CREATININE-BSD FRML MDRD: 45 ML/MIN/1.73SQ M
GFR SERPL CREATININE-BSD FRML MDRD: 45 ML/MIN/1.73SQ M
GLUCOSE SERPL-MCNC: 89 MG/DL (ref 65–140)
GLUCOSE SERPL-MCNC: 89 MG/DL (ref 65–140)
GLUCOSE SERPL-MCNC: 92 MG/DL (ref 65–140)
GLUCOSE SERPL-MCNC: 92 MG/DL (ref 65–140)
HCO3 BLDA-SCNC: 21.3 MMOL/L (ref 24–30)
HCO3 BLDA-SCNC: 21.3 MMOL/L (ref 24–30)
HCT VFR BLD AUTO: 40.9 % (ref 34.8–46.1)
HCT VFR BLD AUTO: 40.9 % (ref 34.8–46.1)
HCT VFR BLD CALC: 38 % (ref 34.8–46.1)
HCT VFR BLD CALC: 38 % (ref 34.8–46.1)
HGB BLD-MCNC: 12.7 G/DL (ref 11.5–15.4)
HGB BLD-MCNC: 12.7 G/DL (ref 11.5–15.4)
HGB BLDA-MCNC: 12.9 G/DL (ref 11.5–15.4)
HGB BLDA-MCNC: 12.9 G/DL (ref 11.5–15.4)
IMM GRANULOCYTES # BLD AUTO: 0.03 THOUSAND/UL (ref 0–0.2)
IMM GRANULOCYTES # BLD AUTO: 0.03 THOUSAND/UL (ref 0–0.2)
IMM GRANULOCYTES NFR BLD AUTO: 0 % (ref 0–2)
IMM GRANULOCYTES NFR BLD AUTO: 0 % (ref 0–2)
LYMPHOCYTES # BLD AUTO: 2.35 THOUSANDS/ΜL (ref 0.6–4.47)
LYMPHOCYTES # BLD AUTO: 2.35 THOUSANDS/ΜL (ref 0.6–4.47)
LYMPHOCYTES NFR BLD AUTO: 28 % (ref 14–44)
LYMPHOCYTES NFR BLD AUTO: 28 % (ref 14–44)
MCH RBC QN AUTO: 28.9 PG (ref 26.8–34.3)
MCH RBC QN AUTO: 28.9 PG (ref 26.8–34.3)
MCHC RBC AUTO-ENTMCNC: 31.1 G/DL (ref 31.4–37.4)
MCHC RBC AUTO-ENTMCNC: 31.1 G/DL (ref 31.4–37.4)
MCV RBC AUTO: 93 FL (ref 82–98)
MCV RBC AUTO: 93 FL (ref 82–98)
MONOCYTES # BLD AUTO: 0.74 THOUSAND/ΜL (ref 0.17–1.22)
MONOCYTES # BLD AUTO: 0.74 THOUSAND/ΜL (ref 0.17–1.22)
MONOCYTES NFR BLD AUTO: 9 % (ref 4–12)
MONOCYTES NFR BLD AUTO: 9 % (ref 4–12)
NEUTROPHILS # BLD AUTO: 5.18 THOUSANDS/ΜL (ref 1.85–7.62)
NEUTROPHILS # BLD AUTO: 5.18 THOUSANDS/ΜL (ref 1.85–7.62)
NEUTS SEG NFR BLD AUTO: 61 % (ref 43–75)
NEUTS SEG NFR BLD AUTO: 61 % (ref 43–75)
NRBC BLD AUTO-RTO: 0 /100 WBCS
NRBC BLD AUTO-RTO: 0 /100 WBCS
PCO2 BLD: 22 MMOL/L (ref 21–32)
PCO2 BLD: 22 MMOL/L (ref 21–32)
PCO2 BLD: 30.8 MM HG (ref 42–50)
PCO2 BLD: 30.8 MM HG (ref 42–50)
PH BLD: 7.45 [PH] (ref 7.3–7.4)
PH BLD: 7.45 [PH] (ref 7.3–7.4)
PLATELET # BLD AUTO: 244 THOUSANDS/UL (ref 149–390)
PLATELET # BLD AUTO: 244 THOUSANDS/UL (ref 149–390)
PMV BLD AUTO: 10.4 FL (ref 8.9–12.7)
PMV BLD AUTO: 10.4 FL (ref 8.9–12.7)
PO2 BLD: 46 MM HG (ref 35–45)
PO2 BLD: 46 MM HG (ref 35–45)
POTASSIUM BLD-SCNC: 4.4 MMOL/L (ref 3.5–5.3)
POTASSIUM BLD-SCNC: 4.4 MMOL/L (ref 3.5–5.3)
POTASSIUM SERPL-SCNC: 4.4 MMOL/L (ref 3.5–5.3)
POTASSIUM SERPL-SCNC: 4.4 MMOL/L (ref 3.5–5.3)
RBC # BLD AUTO: 4.39 MILLION/UL (ref 3.81–5.12)
RBC # BLD AUTO: 4.39 MILLION/UL (ref 3.81–5.12)
SAO2 % BLD FROM PO2: 84 % (ref 60–85)
SAO2 % BLD FROM PO2: 84 % (ref 60–85)
SODIUM BLD-SCNC: 142 MMOL/L (ref 136–145)
SODIUM BLD-SCNC: 142 MMOL/L (ref 136–145)
SODIUM SERPL-SCNC: 141 MMOL/L (ref 135–147)
SODIUM SERPL-SCNC: 141 MMOL/L (ref 135–147)
SPECIMEN SOURCE: ABNORMAL
SPECIMEN SOURCE: ABNORMAL
WBC # BLD AUTO: 8.52 THOUSAND/UL (ref 4.31–10.16)
WBC # BLD AUTO: 8.52 THOUSAND/UL (ref 4.31–10.16)

## 2022-09-24 PROCEDURE — 80048 BASIC METABOLIC PNL TOTAL CA: CPT | Performed by: SURGERY

## 2022-09-24 PROCEDURE — 84484 ASSAY OF TROPONIN QUANT: CPT | Performed by: FAMILY MEDICINE

## 2022-09-24 PROCEDURE — 70496 CT ANGIOGRAPHY HEAD: CPT

## 2022-09-24 PROCEDURE — NC001 PR NO CHARGE: Performed by: EMERGENCY MEDICINE

## 2022-09-24 PROCEDURE — 99285 EMERGENCY DEPT VISIT HI MDM: CPT

## 2022-09-24 PROCEDURE — 84132 ASSAY OF SERUM POTASSIUM: CPT

## 2022-09-24 PROCEDURE — 36415 COLL VENOUS BLD VENIPUNCTURE: CPT | Performed by: EMERGENCY MEDICINE

## 2022-09-24 PROCEDURE — 99223 1ST HOSP IP/OBS HIGH 75: CPT | Performed by: FAMILY MEDICINE

## 2022-09-24 PROCEDURE — 71045 X-RAY EXAM CHEST 1 VIEW: CPT

## 2022-09-24 PROCEDURE — 71260 CT THORAX DX C+: CPT

## 2022-09-24 PROCEDURE — 99223 1ST HOSP IP/OBS HIGH 75: CPT | Performed by: SURGERY

## 2022-09-24 PROCEDURE — 93308 TTE F-UP OR LMTD: CPT | Performed by: SURGERY

## 2022-09-24 PROCEDURE — 85014 HEMATOCRIT: CPT

## 2022-09-24 PROCEDURE — 82330 ASSAY OF CALCIUM: CPT

## 2022-09-24 PROCEDURE — 76705 ECHO EXAM OF ABDOMEN: CPT | Performed by: SURGERY

## 2022-09-24 PROCEDURE — 85025 COMPLETE CBC W/AUTO DIFF WBC: CPT | Performed by: SURGERY

## 2022-09-24 PROCEDURE — 82947 ASSAY GLUCOSE BLOOD QUANT: CPT

## 2022-09-24 PROCEDURE — 84484 ASSAY OF TROPONIN QUANT: CPT | Performed by: SURGERY

## 2022-09-24 PROCEDURE — 74177 CT ABD & PELVIS W/CONTRAST: CPT

## 2022-09-24 PROCEDURE — 84295 ASSAY OF SERUM SODIUM: CPT

## 2022-09-24 PROCEDURE — 82803 BLOOD GASES ANY COMBINATION: CPT

## 2022-09-24 PROCEDURE — 99223 1ST HOSP IP/OBS HIGH 75: CPT | Performed by: PSYCHIATRY & NEUROLOGY

## 2022-09-24 PROCEDURE — 87081 CULTURE SCREEN ONLY: CPT | Performed by: FAMILY MEDICINE

## 2022-09-24 PROCEDURE — 70498 CT ANGIOGRAPHY NECK: CPT

## 2022-09-24 RX ORDER — ZONISAMIDE 100 MG/1
100 CAPSULE ORAL
Status: DISCONTINUED | OUTPATIENT
Start: 2022-09-24 | End: 2022-09-27 | Stop reason: HOSPADM

## 2022-09-24 RX ORDER — IBUPROFEN 600 MG/1
TABLET ORAL EVERY 6 HOURS PRN
COMMUNITY
End: 2022-09-27

## 2022-09-24 RX ORDER — BISACODYL 10 MG
10 SUPPOSITORY, RECTAL RECTAL DAILY PRN
COMMUNITY

## 2022-09-24 RX ORDER — LEVETIRACETAM 500 MG/1
500 TABLET ORAL EVERY 12 HOURS SCHEDULED
COMMUNITY

## 2022-09-24 RX ORDER — SENNA PLUS 8.6 MG/1
2 TABLET ORAL 2 TIMES DAILY
COMMUNITY

## 2022-09-24 RX ORDER — PRAVASTATIN SODIUM 20 MG
20 TABLET ORAL
Status: DISCONTINUED | OUTPATIENT
Start: 2022-09-24 | End: 2022-09-27 | Stop reason: HOSPADM

## 2022-09-24 RX ORDER — SENNOSIDES 8.6 MG
2 TABLET ORAL 2 TIMES DAILY
Status: DISCONTINUED | OUTPATIENT
Start: 2022-09-24 | End: 2022-09-27 | Stop reason: HOSPADM

## 2022-09-24 RX ORDER — SODIUM PHOSPHATE, DIBASIC AND SODIUM PHOSPHATE, MONOBASIC 7; 19 G/133ML; G/133ML
1 ENEMA RECTAL DAILY PRN
Status: DISCONTINUED | OUTPATIENT
Start: 2022-09-24 | End: 2022-09-27 | Stop reason: HOSPADM

## 2022-09-24 RX ORDER — LEVOTHYROXINE SODIUM 0.03 MG/1
25 TABLET ORAL DAILY
Status: DISCONTINUED | OUTPATIENT
Start: 2022-09-25 | End: 2022-09-27 | Stop reason: HOSPADM

## 2022-09-24 RX ORDER — ACETAMINOPHEN 325 MG/1
650 TABLET ORAL EVERY 6 HOURS PRN
COMMUNITY

## 2022-09-24 RX ORDER — ALBUTEROL SULFATE 90 UG/1
2 AEROSOL, METERED RESPIRATORY (INHALATION) EVERY 6 HOURS PRN
COMMUNITY

## 2022-09-24 RX ORDER — ACETAMINOPHEN 325 MG/1
650 TABLET ORAL EVERY 6 HOURS PRN
Status: DISCONTINUED | OUTPATIENT
Start: 2022-09-24 | End: 2022-09-25

## 2022-09-24 RX ORDER — LEVETIRACETAM 500 MG/1
500 TABLET ORAL EVERY 12 HOURS SCHEDULED
Status: DISCONTINUED | OUTPATIENT
Start: 2022-09-24 | End: 2022-09-27 | Stop reason: HOSPADM

## 2022-09-24 RX ORDER — LEVOTHYROXINE SODIUM 0.03 MG/1
25 TABLET ORAL DAILY
COMMUNITY

## 2022-09-24 RX ORDER — GUAIFENESIN 100 MG/5ML
200 SYRUP ORAL 3 TIMES DAILY PRN
COMMUNITY

## 2022-09-24 RX ORDER — CALCIUM CARBONATE 200(500)MG
1 TABLET,CHEWABLE ORAL DAILY PRN
COMMUNITY

## 2022-09-24 RX ORDER — CALCIUM CARBONATE 200(500)MG
500 TABLET,CHEWABLE ORAL DAILY PRN
Status: DISCONTINUED | OUTPATIENT
Start: 2022-09-24 | End: 2022-09-27 | Stop reason: HOSPADM

## 2022-09-24 RX ORDER — ALBUTEROL SULFATE 90 UG/1
2 AEROSOL, METERED RESPIRATORY (INHALATION) EVERY 6 HOURS PRN
Status: DISCONTINUED | OUTPATIENT
Start: 2022-09-24 | End: 2022-09-27 | Stop reason: HOSPADM

## 2022-09-24 RX ORDER — MULTIVIT-MIN/IRON FUM/FOLIC AC 7.5 MG-4
1 TABLET ORAL DAILY
COMMUNITY

## 2022-09-24 RX ORDER — BISACODYL 10 MG
10 SUPPOSITORY, RECTAL RECTAL DAILY PRN
Status: DISCONTINUED | OUTPATIENT
Start: 2022-09-24 | End: 2022-09-27 | Stop reason: HOSPADM

## 2022-09-24 RX ORDER — ONDANSETRON 2 MG/ML
4 INJECTION INTRAMUSCULAR; INTRAVENOUS EVERY 6 HOURS PRN
Status: DISCONTINUED | OUTPATIENT
Start: 2022-09-24 | End: 2022-09-27 | Stop reason: HOSPADM

## 2022-09-24 RX ORDER — LOVASTATIN 20 MG/1
20 TABLET ORAL
COMMUNITY

## 2022-09-24 RX ORDER — ERYTHROMYCIN 5 MG/G
0.5 OINTMENT OPHTHALMIC
Status: DISCONTINUED | OUTPATIENT
Start: 2022-09-24 | End: 2022-09-27 | Stop reason: HOSPADM

## 2022-09-24 RX ORDER — MAGNESIUM HYDROXIDE 1200 MG/15ML
1 LIQUID ORAL DAILY PRN
COMMUNITY

## 2022-09-24 RX ADMIN — PRAVASTATIN SODIUM 20 MG: 20 TABLET ORAL at 17:55

## 2022-09-24 RX ADMIN — ACETAMINOPHEN 650 MG: 325 TABLET, FILM COATED ORAL at 17:55

## 2022-09-24 RX ADMIN — ERYTHROMYCIN 0.5 INCH: 5 OINTMENT OPHTHALMIC at 21:27

## 2022-09-24 RX ADMIN — ZONISAMIDE 100 MG: 100 CAPSULE ORAL at 21:26

## 2022-09-24 RX ADMIN — IOHEXOL 100 ML: 350 INJECTION, SOLUTION INTRAVENOUS at 12:53

## 2022-09-24 RX ADMIN — SENNOSIDES 17.2 MG: 8.6 TABLET, FILM COATED ORAL at 17:55

## 2022-09-24 NOTE — ASSESSMENT & PLAN NOTE
· Paroxysmal atrial fibrillation  Anticoagulated with Eliquis    By reviewing the nursing home records patient is only getting Eliquis once a day  · Patient with pacemaker

## 2022-09-24 NOTE — ASSESSMENT & PLAN NOTE
· Patient had a CT abdomen as part of the trauma protocol-CT scan showed 6 cm renal mass   6 cm solid mass at the upper pole of the left kidney worrisome for malignancy  The adjacent upper pole of the left kidney appears abnormal   It is uncertain whether this is due to diminished enhancement possibly from arterial involvement versus tumor infiltration  In addition, there is concern for thrombus or tumor thrombus in the left renal vein extending into the IVC  · Patient refuses urology evaluation  · Patient is on Eliquis but she is only taking it once a day-cannot say that the patient is a anticoagulation failure    Could be a tumor thrombus  · Eventually urology follow-up

## 2022-09-24 NOTE — TRAUMA DOCUMENTATION
Patient is refusing EKG and any additional bloodwork  States "EKG my ass " requesting to go back to her "crypt " trauma fellow notified

## 2022-09-24 NOTE — H&P
[address] 1945, 76 y o  female MRN: [id number]  Unit/Bed#: DESTINY Encounter: [id number]  Primary Care Provider: Thomas Simon MD   Date and time admitted to hospital: 9/24/2022 12:10 PM    * Altered mental status, unspecified  Assessment & Plan  · Patient was transferred to the hospital from [facility] due to altered mental status after a fall 2 days  ago  · Patient with history of seizure disorder  · Patient was evaluated by trauma  Had CTA head and neck-unremarkable  · Neurology evaluation appreciated  · Patient had a seizure-like activity lasting approximately 5 minutes ago and patient was taken to [facility] and she refused to stay and signed AMA  · MRI pending  · No signs of infection    Hypothyroidism  Assessment & Plan  · Continue Synthroid  · Check TSH    Hyperlipidemia  Assessment & Plan  Lovastatin-substituted to pravastatin in the hospital    Hypertension  Assessment & Plan  · Monitor blood pressure  · Do not appear to be any antihypertensives as outpatient    Atrial fibrillation (HCC)  Assessment & Plan  · Paroxysmal atrial fibrillation  Anticoagulated with Eliquis  By reviewing the nursing home records patient is only getting Eliquis once a day  · Patient with pacemaker    History of CVA (cerebrovascular accident)  Assessment & Plan  · Left-sided weakness  · Patient reported that she uses wheelchair  · PT OT evaluation    Renal mass  Assessment & Plan  · Patient had a CT abdomen as part of the trauma protocol-CT scan showed 6 cm renal mass   6 cm solid mass at the upper pole of the left kidney worrisome for malignancy  The adjacent upper pole of the left kidney appears abnormal   It is uncertain whether this is due to diminished enhancement possibly from arterial involvement versus tumor infiltration    In addition, there is concern for thrombus or tumor thrombus in the left renal vein extending into the IVC  · Patient refuses urology evaluation  · Patient is on Eliquis but she is only taking it once a day-cannot say that the patient is a anticoagulation failure  Could be a tumor thrombus  · Eventually urology follow-up    Seizure [facility])  Assessment & Plan  · Patient with known history of seizure disorder followed by Dr Marshal Mo with [facility]  · Patient refused seizure medication in the past  · Supposed to be on Keppra 500 mg p o  B i d -but has been refusing  As per [facility] nursing home staff patient to last 5 doses of Keppra  · Neurology is adding  zonisamide 100 mg HS for 1 week and then 200 mg HS for 1 week and 300 mg HS thereafter  Once the zonisamide level is therapeutic can discontinue Keppra  · Will obtain a capacity evaluation      VTE Pharmacologic Prophylaxis:   eliquis  Code Status: Level 3 - DNAR and DNI   Discussion with family:  Called sister who is the power of  and left voicemail     Anticipated Length of Stay: Patient will be admitted on an inpatient basis with an anticipated length of stay of greater than 2 midnights secondary to Workup for acute mental status change  Total Time for Visit, including Counseling / Coordination of Care: 70 minutes Greater than 50% of this total time spent on direct patient counseling and coordination of care  Chief Complaint:  Change in mental status    History of Present Illness:  Maury Hines is a 76 y o  female with a PMH of seizure disorder  Previous C CVA/TIA  Hypertension hyperlipidemia and hypothyroid who presents due to acute change in mental status  Patient is a resident of [facility]  Patient is not able to provide reliable history  Patient gets very agitated with questioning and she is recurs in to be discharged back to the facility but was eventually able to convince her to stay in the hospital  Most of the history is obtained from reviewing the chart    It appears that patient had an and administered fall 2 days ago  Patient with ecchymosis and hematoma of left side of the face and periorbital ecchymosis  It appears that the patient refuses to go to the hospital at that time  Since then facility noted that the patient is with altered mental status and was brought to the hospital   Patient was evaluated by trauma and trauma workup was essentially negative  Patient was also evaluated by Neurology  Patient with known history of seizure disorder but not compliant with the medications  Discussed with the patient about the abnormality finding on the CT scan-patient reported that she wants to wait to be evaluated by Urology  Patient gets frustrated very easily    Review of Systems:  Review of Systems   Constitutional: Negative  HENT: Negative  Eyes: Negative  Respiratory: Negative  Cardiovascular: Negative  Gastrointestinal: Negative  Genitourinary: Negative  Musculoskeletal: Positive for gait problem  Skin: Positive for color change, rash and wound  Neurological: Positive for seizures  Hematological: Negative  Review of system is rather limited  Past Medical and Surgical History:   History reviewed  No pertinent past medical history  History reviewed  No pertinent surgical history  Meds/Allergies:  Prior to Admission medications    Medication Sig Start Date End Date Taking?  Authorizing Provider   acetaminophen (TYLENOL) 325 mg tablet Take 650 mg by mouth every 6 (six) hours as needed for mild pain, headaches or fever   Yes Historical Provider, MD   albuterol (PROVENTIL HFA,VENTOLIN HFA) 90 mcg/act inhaler Inhale 2 puffs every 6 (six) hours as needed for wheezing   Yes Historical Provider, MD   apixaban (Eliquis) 5 mg Take 5 mg by mouth daily   Yes Historical Provider, MD   bisacodyl (Dulcolax) 10 mg suppository Insert 10 mg into the rectum daily as needed for constipation   Yes Historical Provider, MD   calcium carbonate (TUMS) 500 mg chewable tablet Chew 1 tablet daily as needed for indigestion or heartburn   Yes Historical Provider, MD   guaiFENesin (ROBITUSSIN) 100 MG/5ML oral liquid Take 200 mg by mouth 3 (three) times a day as needed for cough   Yes Historical Provider, MD   ibuprofen (MOTRIN) 600 mg tablet Take by mouth every 6 (six) hours as needed for moderate pain   Yes Historical Provider, MD   levETIRAcetam (KEPPRA) 500 mg tablet Take 500 mg by mouth every 12 (twelve) hours   Yes Historical Provider, MD   levothyroxine 25 mcg tablet Take 25 mcg by mouth daily   Yes Historical Provider, MD   lovastatin (MEVACOR) 20 mg tablet Take 20 mg by mouth daily at bedtime   Yes Historical Provider, MD   magnesium hydroxide (MILK OF MAGNESIA) 400 mg/5 mL oral suspension Take 30 mL by mouth daily as needed for constipation   Yes Historical Provider, MD   Multiple Vitamins-Minerals (multivitamin with minerals) tablet Take 1 tablet by mouth daily   Yes Historical Provider, MD   senna (SENOKOT) 8 6 MG tablet Take 2 tablets by mouth 2 (two) times a day   Yes Historical Provider, MD   sodium phosphate (FLEET) 7-19 G 118 mL enema Insert 1 enema into the rectum daily as needed   Yes Historical Provider, MD     I have reveiwed home medications using records provided by [facility]  Allergies: No Known Allergies    Social History:  Marital Status:    Occupation: retired  Patient Pre-hospital Living Situation: [facility]  Patient Pre-hospital Level of Mobility: manual wheelchair  Patient Pre-hospital Diet Restrictions:   Substance Use History:   Social History     Substance and Sexual Activity   Alcohol Use None     Social History     Tobacco Use   Smoking Status Not on file   Smokeless Tobacco Not on file     Social History     Substance and Sexual Activity   Drug Use Not on file       Family History:  History reviewed  No pertinent family history      Physical Exam:     Vitals:   Blood Pressure: (!) 176/90 (09/24/22 1721)  Pulse: 76 (09/24/22 1721)  Temperature: 97 7 °F (36 5 °C) (09/24/22 1721)  Temp Source: Tympanic (09/24/22 1209)  Respirations: 16 (09/24/22 1721)  Weight - Scale: 72 kg (158 lb 11 7 oz) (09/24/22 1209)  SpO2: 97 % (09/24/22 1721)    Physical Exam  Constitutional:       General: She is not in acute distress  Eyes:      Comments: Laceration with scab over the left orbit on the lateral part  Periorbital ecchymosis bilateral   Cardiovascular:      Rate and Rhythm: Normal rate  Comments: Pacemaker present  Pulmonary:      Breath sounds: Normal breath sounds  Abdominal:      General: Abdomen is flat  There is no distension  Musculoskeletal:         General: No swelling or tenderness  Right lower leg: No edema  Left lower leg: No edema  Skin:     Findings: Bruising present  Comments: Hematoma/ecchymosis of the left side of the   Neurological:      General: No focal deficit present  Mental Status: She is alert  Deep Tendon Reflexes: Abnormal reflex: Additional Data:     Lab Results:  Results from last 7 days   Lab Units 09/24/22  1229 09/24/22  1223   WBC Thousand/uL  --  8 52   HEMOGLOBIN g/dL  --  12 7   I STAT HEMOGLOBIN g/dl 12 9  --    HEMATOCRIT %  --  40 9   HEMATOCRIT, ISTAT % 38  --    PLATELETS Thousands/uL  --  244   NEUTROS PCT %  --  61   LYMPHS PCT %  --  28   MONOS PCT %  --  9   EOS PCT %  --  2     Results from last 7 days   Lab Units 09/24/22  1229 09/24/22  1223   SODIUM mmol/L  --  141   POTASSIUM mmol/L  --  4 4   CHLORIDE mmol/L  --  113*   CO2 mmol/L  --  23   CO2, I-STAT mmol/L 22  --    BUN mg/dL  --  24   CREATININE mg/dL  --  1 14   ANION GAP mmol/L  --  5   CALCIUM mg/dL  --  9 1   GLUCOSE RANDOM mg/dL  --  89                       Imaging:  CT chest abdomen and pelvis  CTA head and CT cervical spine and chest x-ray reviewed  CTA head and neck w wo contrast   Final Result by Jd Page MD (09/24 1348)      Left frontal scalp hematoma without intracranial hemorrhage or calvarial fracture  Chronic microangiopathy changes  No cervical or intracranial large vessel occlusion, aneurysm or dissection injury  Moderate right ICA origin stenosis due to atherosclerotic disease  Left occipital sebaceous cyst             I personally discussed this study with Bela Sales on 9/24/2022 at 1:46 PM   Please note that there was difficulty to reach the trauma service immediately after interpretation as they were unavailable due to patient care duties  Workstation performed: [id number]         CT recon only cervical spine (No Charge)   Final Result by Jethro Bertrand MD (09/24 1344)      No cervical spine fracture or traumatic malalignment  Cervical spondylosis  I personally discussed this study with Bela Sales on 9/24/2022 at 1:48 PM   Please note that the trauma service was difficult to reach immediately after the interpretation of this study as they were involved in patient care duties  Workstation performed: [id number]         CT recon (no charge)   Final Result by Aracelis Mg MD (09/24 1262)      No evidence of acute traumatic injury to the facial bones  I personally discussed this study with Bela Sales on 9/24/2022 at 1:55 PM                   Workstation performed: [id number]         CT chest abdomen pelvis w contrast   Final Result by Aracelis Mg MD (09/24 1720)      1   6 cm solid mass at the upper pole of the left kidney worrisome for malignancy  The adjacent upper pole of the left kidney appears abnormal   It is uncertain whether this is due to diminished enhancement possibly from arterial involvement versus    tumor infiltration  In addition, there is concern for thrombus or tumor thrombus in the left renal vein extending into the IVC  2   3 cm infrarenal abdominal aortic aneurysm  3   Cardiomegaly        I personally discussed this study with Bela Sales on 9/24/2022 at 1:32 PM  Workstation performed: [id number]         XR Trauma multiple (SLB/SLRA trauma bay ONLY)   Final Result by Jam Kincaid MD (09/24 1706)      No acute cardiopulmonary disease within limitations of supine imaging  Workstation performed: [id number]         XR chest 1 view    (Results Pending)   MRI inpatient order    (Results Pending)       EKG and Other Studies Reviewed on Admission:   · EKG:  No imaging    ** Please Note: This note has been constructed using a voice recognition system   **

## 2022-09-24 NOTE — ASSESSMENT & PLAN NOTE
- Patient with known seizure history  - Recently evaluated by Dr Tosin Sung at the [facility] on 06/02/2022 for seizures  It is documented that patient had a known history of seizures and refused AED medication in the past, however patient was documented to be a poor historian  She was instructed to initiate Keppra 500 mg BID, however patient reported that the medication made her drowsy and caused headaches   It was discovered at that time that she had trialed Keppra and Depakote in the past  Patient was then supposed to transition to Zonisamide 300 mg HS, however after speaking with staff ANGELO Pantoja at [facility], patient did not initiate Bobbye Margaret and only has an order for Keppra 500 mg BID (patient has been refusing medication however has taken the last 5 doses)  - See plan above

## 2022-09-24 NOTE — ASSESSMENT & PLAN NOTE
· Patient was transferred to the hospital from [facility] due to altered mental status after a fall 2 days  ago  · Patient with history of seizure disorder  · Patient was evaluated by trauma    Had CTA head and neck-unremarkable  · Neurology evaluation appreciated  · Patient had a seizure-like activity lasting approximately 5 minutes ago and patient was taken to [facility] and she refused to stay and signed AMA  · MRI pending  · No signs of infection

## 2022-09-24 NOTE — CONSULTS
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, HTN, HLD, Afib on Eliquis and hypothyroidism who presented to [facility] with altered mental status  Patient sustained a fall x 2 days ago and refused to seek medical attention at that time, however staff RN Michelle at [facility] reports change in mental status since this time (delayed speech, delayed motor movements, worsening left sided weakness, pocketing medication/food)  Per EMS report, patient is confused and has worsening left sided weakness, although patient states this is her baseline strength  Upon arrival to ED, systolic BP ranged from 055-721  GCS 14  Labs unremarkable  CTH/CTA head/neck unremarkable  CT C/A/P revealed 6 cm solid mass of left kidney, worrisome for malignancy, as well as a concern for thrombus vs tumor thrombus in L renal vein extending into IVC and 3 cm infrarenal AAA  Patient sustained an unwitnessed fall with + head strike and "seizure like activity" lasting approx  5 minutes with teeth/hand clenching x 1 week ago  She was taken to [facility], however she refused treatment signed out AMA  She had a second fall x 2 days ago, no reported seizure like activity but refused to seek medical treatment at that time  Concern that patient is having breakthrough seizures secondary to medication noncompliance, espeically since she's had 2 falls within the last week       Plan:  - Initiate Zonisamide 100 mg HS x 1 week, then 200 mg HS x 1 week, then 300 mg HS thereafter   - Continue Keppra 500 mg BID until therapeutic Zonisamide level, then can transition to Zonisamide monotherapy   - Continue with Eliquis 5 mg BID   - MRI brain with and without contrast pending (given possible renal malignancy seen on CT)   - No need for EEG at this time per attending neurologist   - Recommend evaluation for capacity as patient has limited insight on health history and has been documented to be an unreliable historian   - Recommend checking for other metabolic derangements that could be causing altered mentation   - Recommend heme/onc consult for possible malignancy seen on CT C/A/P  - PT/OT therapies   - Medical management and supportive care per primary team  Correction of any metabolic or infectious disturbances  Plan discussed with Attending Neurologist, please see attestation for further input/recommendations  Seizure [facility])  Assessment & Plan  - Patient with known seizure history  - Recently evaluated by Dr Hunter Hinton at the [facility] Neurology Epilepsy on 06/02/2022 for seizures  It is documented that patient had a known history of seizures and refused AED medication in the past, however patient was documented to be a poor historian  She was instructed to initiate Keppra 500 mg BID, however patient reported that the medication made her drowsy and caused headaches  It was discovered at that time that she had trialed Keppra and Depakote in the past  Patient was then supposed to transition to Zonisamide 300 mg HS, however after speaking with staff RN Sara Quinonez at 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, HTN, HLD, Afib on Eliquis and hypothyroidism who presented to [facility] with altered mental status  Patient sustained a fall x 2 days ago and refused to seek medical attention at that time, however staff ANGELO Martinez at [facility] reports change in mental status since this time (delayed speech, delayed motor movements, worsening left sided weakness, pocketing medication/food)  Per EMS report, patient is confused and has worsening left sided weakness, although patient states this is her baseline strength  Upon arrival to ED, systolic BP ranged from 566-215  GCS 14  Labs unremarkable  CTH/CTA head/neck unremarkable  CT C/A/P revealed 6 cm solid mass of left kidney, worrisome for malignancy, as well as a concern for thrombus vs tumor thrombus in L renal vein extending into IVC and 3 cm infrarenal AAA  Patient sustained an unwitnessed fall with + head strike and "seizure like activity" lasting approx  5 minutes with teeth/hand clenching x 1 week ago  She was taken to [facility], however she refused treatment signed out AMA  Prior Neuro History  - Patient presented to the ED on 03/10/2022 s/p seizure like activity  She was witnessed to be sitting in a chair and fell forward with seizure like activity x few minutes before resolving  She was back to baseline by the time she got to the ED  CTH revealed a scalp hematoma  No AED medication was initiated at that time  - Patient recently evaluated by Dr Marshal Mo at the [facility] Neurology Epilepsy on 06/02/2022 for seizures  It is documented that patient had a known history of seizures and refused AED medication in the past, however patient was documented to be a poor historian  She was instructed to initiate Keppra 500 mg BID, however patient reported that the medication made her drowsy and caused headaches   It was discovered at that time that she had trialed Keppra and Depakote in the past  Patient was then supposed to transition to Zonisamide 300 mg HS, however after speaking with staff ANGELO Campuzano at [facility], patient did not initiate Charanjit Diaz and only has an order for Keppra 500 mg BID (patient has been refusing medication however has taken the last 5 doses)        Inpatient consult to Neurology  Consult performed by: PETER Arora  Consult ordered by: Bonny Rodriguez MD          Review of Systems   Unable to perform ROS: Mental status change (patient is an unreliable historian )       Historical Information   No past medical history on file  No past surgical history on file  Social History   Social History     Substance and Sexual Activity   Alcohol Use Not on file     Social History     Substance and Sexual Activity   Drug Use Not on file     No existing history information found  No existing history information found  Social History     Tobacco Use   Smoking Status Not on file   Smokeless Tobacco Not on file     Family History: No family history on file  Review of previous medical records was completed  Meds/Allergies   all current active meds have been reviewed, current meds:   Current Facility-Administered Medications   Medication Dose Route Frequency    levETIRAcetam (KEPPRA) tablet 500 mg  500 mg Oral Q12H [facility]    zonisamide (ZONEGRAN) capsule 100 mg  100 mg Oral HS    and PTA meds:   None       Not on File    Objective   Vitals:Blood pressure (!) 190/95, pulse 76, temperature 98 6 °F (37 °C), temperature source Tympanic, resp  rate 20, weight 72 kg (158 lb 11 7 oz), SpO2 98 %  ,There is no height or weight on file to calculate BMI  No intake or output data in the 24 hours ending 09/24/22 1507    Invasive Devices: Invasive Devices  Report    None                 Physical Exam  Vitals reviewed  Constitutional:       General: She is not in acute distress  Appearance: She is normal weight  She is not ill-appearing  HENT:      Head: Normocephalic        Comments: Left facial swelling and brusing     Right Ear: External ear normal       Left Ear: External ear normal       Nose: Nose normal  Mouth/Throat:      Mouth: Mucous membranes are moist    Eyes:      General:         Right eye: No discharge  Left eye: No discharge  Extraocular Movements: Extraocular movements intact  Conjunctiva/sclera: Conjunctivae normal       Pupils: Pupils are equal, round, and reactive to light  Comments: Left eye periorbital swelling and healing laceration    Neck:      Comments: Cervical collar in place  Cardiovascular:      Rate and Rhythm: Normal rate  Pulmonary:      Effort: Pulmonary effort is normal  No respiratory distress  Musculoskeletal:         General: Normal range of motion  Right lower leg: No edema  Left lower leg: No edema  Skin:     General: Skin is warm and dry  Coloration: Skin is not pale  Findings: Bruising (left facial and periorbital bruising ) present  Neurological:      Mental Status: She is alert and oriented to person, place, and time  Motor: Weakness present  Comments: See below    Psychiatric:      Comments: Agitated, hostile, guarded       Neurologic Exam     Mental Status   Oriented to person, place, and time  Patient is alert, oriented to self/place/date  Normal attention/concentration  Speech is clear  Follows simple commands with encouragement  Refused to do calculations or name objects  Patient is agitated, hostile and guarded towards this examiner  When asked if she prefers to go by Nani Alonso, patient yelled profanity at this provider  Cranial Nerves     CN III, IV, VI   Pupils are equal, round, and reactive to light  PERRL  EOMs intact  Refused to partake in visual field testing, blink to threat intact bilaterally  Conjugate gaze present  Facial features appear symmetric however patient refused to smile  Refused for provider to assess for facial sensation secondary to bruising present (sustained after fall)  Hearing intact  Refused to stick out tongue or shrug shoulders        Motor Exam   Muscle bulk: normal  Overall muscle tone: normal  Right arm pronator drift: absent  Left arm pronator drift: unable to perform   Right upper extremity: 4/5  strength, 4+/5 biceps, refused to test triceps/dorsi/plantar flexion motor testing     Left upper extremity: 3/5  strength, 3+/5 biceps, 3/5 triceps, refused to dorsi/plantar flex - patient initially reported her weakness is worse, but then said her weakness is at her baseline     Right lower extremity: refused to follow formal motor testing but was able to lift it anti gravity x 5 seconds     Left lower extremity: refused to follow formal motor testing but was able to lift antigravity x 2 seconds      Sensory Exam     Reports decreased light sensation on left upper and lower extremity     Gait, Coordination, and Reflexes     Gait  Gait: (deferred for safety)  Refused coordination exam   No resting tremor seen   Negative babinski   Negative ankle clonus        Lab Results:   I have personally reviewed pertinent reports  , CBC:   Results from last 7 days   Lab Units 09/24/22  1229 09/24/22  1223   WBC Thousand/uL  --  8 52   RBC Million/uL  --  4 39   HEMOGLOBIN g/dL  --  12 7   I STAT HEMOGLOBIN g/dl 12 9  --    HEMATOCRIT %  --  40 9   HEMATOCRIT, ISTAT % 38  --    MCV fL  --  93   PLATELETS Thousands/uL  --  244   , BMP/CMP:   Results from last 7 days   Lab Units 09/24/22  1229 09/24/22  1223   SODIUM mmol/L  --  141   POTASSIUM mmol/L  --  4 4   CHLORIDE mmol/L  --  113*   CO2 mmol/L  --  23   CO2, I-STAT mmol/L 22  --    BUN mg/dL  --  24   CREATININE mg/dL  --  1 14   GLUCOSE, ISTAT mg/dl 92  --    CALCIUM mg/dL  --  9 1   EGFR ml/min/1 73sq m  --  45   Imaging Studies: I have personally reviewed pertinent reports  and I have personally reviewed pertinent films in PACS 14 [facility] CTA head/neck   EKG, Pathology, and Other Studies: I have personally reviewed pertinent reports     and I have personally reviewed pertinent films in PACS  VTE Prophylaxis: Patient currently in ED     Code Status: No Order

## 2022-09-24 NOTE — ASSESSMENT & PLAN NOTE
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, HTN, HLD, Afib on Eliquis and hypothyroidism who presented to B with altered mental status  Patient sustained a fall x 2 days ago and refused to seek medical attention at that time, however staff RN Michelle at [facility] reports change in mental status since this time (delayed speech, delayed motor movements, worsening left sided weakness, pocketing medication/food)  Per EMS report, patient is confused and has worsening left sided weakness, although patient states this is her baseline strength  Upon arrival to ED, systolic BP ranged from 695-947  GCS 14  Labs unremarkable  CTH/CTA head/neck unremarkable  CT C/A/P revealed 6 cm solid mass of left kidney, worrisome for malignancy, as well as a concern for thrombus vs tumor thrombus in L renal vein extending into IVC and 3 cm infrarenal AAA  Patient sustained an unwitnessed fall with + head strike and "seizure like activity" lasting approx  5 minutes with teeth/hand clenching x 1 week ago  She was taken to [facility], however she refused treatment signed out AMA  She had a second fall x 2 days ago, no reported seizure like activity but refused to seek medical treatment at that time  Concern that patient is having breakthrough seizures secondary to medication noncompliance, espeically since she's had 2 falls within the last week       Plan:  - Initiate Zonisamide 100 mg HS x 1 week, then 200 mg HS x 1 week, then 300 mg HS thereafter   - Continue Keppra 500 mg BID until therapeutic Zonisamide level, then can transition to Zonisamide monotherapy   - Continue with Eliquis 5 mg BID   - MRI brain with and without contrast pending (given possible renal malignancy seen on CT)   - No need for EEG at this time per attending neurologist   - Recommend evaluation for capacity as patient has limited insight on health history and has been documented to be an unreliable historian   - Recommend checking for other metabolic derangements that could be causing altered mentation   - Recommend heme/onc consult for possible malignancy seen on CT C/A/P  - PT/OT therapies   - Medical management and supportive care per primary team  Correction of any metabolic or infectious disturbances  Plan discussed with Attending Neurologist, please see attestation for further input/recommendations

## 2022-09-24 NOTE — ASSESSMENT & PLAN NOTE
· Patient with known history of seizure disorder followed by Dr Emmett Crawford with [facility] Neurology  · Patient refused seizure medication in the past  · Supposed to be on Keppra 500 mg p o  B i d -but has been refusing  As per [facility] staff patient to last 5 doses of Keppra  · Neurology is adding  zonisamide 100 mg HS for 1 week and then 200 mg HS for 1 week and 300 mg HS thereafter    Once the zonisamide level is therapeutic can discontinue Keppra  · Will obtain a capacity evaluation

## 2022-09-24 NOTE — PROGRESS NOTES
Spiritual Care Progress Note    2022  Patient: Radu Quezada : 1945  Admission Date & Time: 2022 1210  MRN: [id number] CSN: [id number]        On call nicanor Yan responded at  to LB T, 64 yof who had fallen  Pt was unavailable for conversation  Per EMS: pt was from a care facility  EMS shared that family and the facility had been in contact and the family is aware of pt's location  If Pastoral Care can of service please contact us               Chaplaincy Interventions Utilized:       Relationship Building: Cultivated a relationship of care and support       22 1200   Clinical Encounter Type   Visited With Patient not available   Crisis Visit ED  (fall)

## 2022-09-24 NOTE — PLAN OF CARE
Problem: MOBILITY - ADULT  Goal: Maintain or return to baseline ADL function  Description: INTERVENTIONS:  -  Assess patient's ability to carry out ADLs; assess patient's baseline for ADL function and identify physical deficits which impact ability to perform ADLs (bathing, care of mouth/teeth, toileting, grooming, dressing, etc )  - Assess/evaluate cause of self-care deficits   - Assess range of motion  - Assess patient's mobility; develop plan if impaired  - Assess patient's need for assistive devices and provide as appropriate  - Encourage maximum independence but intervene and supervise when necessary  - Involve family in performance of ADLs  - Assess for home care needs following discharge   - Consider OT consult to assist with ADL evaluation and planning for discharge  - Provide patient education as appropriate  Outcome: Progressing  Goal: Maintains/Returns to pre admission functional level  Description: INTERVENTIONS:  - Perform BMAT or MOVE assessment daily    - Set and communicate daily mobility goal to care team and patient/family/caregiver  - Collaborate with rehabilitation services on mobility goals if consulted  - Perform Range of Motion  times a day  - Reposition patient every  hours    - Dangle patient  times a day  - Stand patient  times a day  - Ambulate patient  times a day  - Out of bed to chair  times a day   - Out of bed for meals  times a day  - Out of bed for toileting  - Record patient progress and toleration of activity level   Outcome: Progressing     Problem: PAIN - ADULT  Goal: Verbalizes/displays adequate comfort level or baseline comfort level  Description: Interventions:  - Encourage patient to monitor pain and request assistance  - Assess pain using appropriate pain scale  - Administer analgesics based on type and severity of pain and evaluate response  - Implement non-pharmacological measures as appropriate and evaluate response  - Consider cultural and social influences on pain and pain management  - Notify physician/advanced practitioner if interventions unsuccessful or patient reports new pain  Outcome: Progressing     Problem: INFECTION - ADULT  Goal: Absence or prevention of progression during hospitalization  Description: INTERVENTIONS:  - Assess and monitor for signs and symptoms of infection  - Monitor lab/diagnostic results  - Monitor all insertion sites, i e  indwelling lines, tubes, and drains  - Monitor endotracheal if appropriate and nasal secretions for changes in amount and color  - [place] appropriate cooling/warming therapies per order  - Administer medications as ordered  - Instruct and encourage patient and family to use good hand hygiene technique  - Identify and instruct in appropriate isolation precautions for identified infection/condition  Outcome: Progressing  Goal: Absence of fever/infection during neutropenic period  Description: INTERVENTIONS:  - Monitor WBC    Outcome: Progressing     Problem: SAFETY ADULT  Goal: Maintain or return to baseline ADL function  Description: INTERVENTIONS:  -  Assess patient's ability to carry out ADLs; assess patient's baseline for ADL function and identify physical deficits which impact ability to perform ADLs (bathing, care of mouth/teeth, toileting, grooming, dressing, etc )  - Assess/evaluate cause of self-care deficits   - Assess range of motion  - Assess patient's mobility; develop plan if impaired  - Assess patient's need for assistive devices and provide as appropriate  - Encourage maximum independence but intervene and supervise when necessary  - Involve family in performance of ADLs  - Assess for home care needs following discharge   - Consider OT consult to assist with ADL evaluation and planning for discharge  - Provide patient education as appropriate  Outcome: Progressing  Goal: Maintains/Returns to pre admission functional level  Description: INTERVENTIONS:  - Perform BMAT or MOVE assessment daily    - Set and communicate daily mobility goal to care team and patient/family/caregiver  - Collaborate with rehabilitation services on mobility goals if consulted  - Perform Range of Motion  times a day  - Reposition patient every  hours    - Dangle patient  times a day  - Stand patient  times a day  - Ambulate patient  times a day  - Out of bed to chair  times a day   - Out of bed for meals  times a day  - Out of bed for toileting  - Record patient progress and toleration of activity level   Outcome: Progressing  Goal: Patient will remain free of falls  Description: INTERVENTIONS:  - Educate patient/family on patient safety including physical limitations  - Instruct patient to call for assistance with activity   - Consult OT/PT to assist with strengthening/mobility   - Keep Call bell within reach  - Keep bed low and locked with side rails adjusted as appropriate  - Keep care items and personal belongings within reach  - Initiate and maintain comfort rounds  - Make Fall Risk Sign visible to staff  - Offer Toileting every  Hours, in advance of need  - Initiate/Maintain alarm  - Obtain necessary fall risk management equipmen  - Apply yellow socks and bracelet for high fall risk patients  - Consider moving patient to room near nurses station  Outcome: Progressing     Problem: DISCHARGE PLANNING  Goal: Discharge to home or other facility with appropriate resources  Description: INTERVENTIONS:  - Identify barriers to discharge w/patient and caregiver  - Arrange for needed discharge resources and transportation as appropriate  - Identify discharge learning needs (meds, wound care, etc )  - Arrange for interpretive services to assist at discharge as needed  - Refer to Case Management Department for coordinating discharge planning if the patient needs post-hospital services based on physician/advanced practitioner order or complex needs related to functional status, cognitive ability, or social support system  Outcome: Progressing Problem: Knowledge Deficit  Goal: Patient/family/caregiver demonstrates understanding of disease process, treatment plan, medications, and discharge instructions  Description: Complete learning assessment and assess knowledge base  Interventions:  - Provide teaching at level of understanding  - Provide teaching via preferred learning methods  Outcome: Progressing     Problem: NEUROSENSORY - ADULT  Goal: Achieves stable or improved neurological status  Description: INTERVENTIONS  - Monitor and report changes in neurological status  - Monitor vital signs such as temperature, blood pressure, glucose, and any other labs ordered   - Initiate measures to prevent increased intracranial pressure  - Monitor for seizure activity and implement precautions if appropriate      Outcome: Progressing  Goal: Remains free of injury related to seizures activity  Description: INTERVENTIONS  - Maintain airway, patient safety  and administer oxygen as ordered  - Monitor patient for seizure activity, document and report duration and description of seizure to physician/advanced practitioner  - If seizure occurs,  ensure patient safety during seizure  - Reorient patient post seizure  - Seizure pads on all 4 side rails  - Instruct patient/family to notify RN of any seizure activity including if an aura is experienced  - Instruct patient/family to call for assistance with activity based on nursing assessment  - Administer anti-seizure medications if ordered    Outcome: Progressing  Goal: Achieves maximal functionality and self care  Description: INTERVENTIONS  - Monitor swallowing and airway patency with patient fatigue and changes in neurological status  - Encourage and assist patient to increase activity and self care     - Encourage visually impaired, hearing impaired and aphasic patients to use assistive/communication devices  Outcome: Progressing

## 2022-09-25 PROBLEM — I82.3 RENAL VEIN THROMBOSIS (HCC): Status: ACTIVE | Noted: 2022-09-25

## 2022-09-25 LAB
MRSA NOSE QL CULT: ABNORMAL
MRSA NOSE QL CULT: ABNORMAL

## 2022-09-25 PROCEDURE — NC001 PR NO CHARGE: Performed by: SURGERY

## 2022-09-25 PROCEDURE — 97166 OT EVAL MOD COMPLEX 45 MIN: CPT

## 2022-09-25 PROCEDURE — 99233 SBSQ HOSP IP/OBS HIGH 50: CPT | Performed by: INTERNAL MEDICINE

## 2022-09-25 PROCEDURE — 97163 PT EVAL HIGH COMPLEX 45 MIN: CPT

## 2022-09-25 PROCEDURE — 99232 SBSQ HOSP IP/OBS MODERATE 35: CPT | Performed by: PSYCHIATRY & NEUROLOGY

## 2022-09-25 RX ORDER — NYSTATIN 100000 [USP'U]/G
POWDER TOPICAL 2 TIMES DAILY
Status: DISCONTINUED | OUTPATIENT
Start: 2022-09-25 | End: 2022-09-27 | Stop reason: HOSPADM

## 2022-09-25 RX ORDER — ACETAMINOPHEN 325 MG/1
975 TABLET ORAL EVERY 8 HOURS SCHEDULED
Status: DISCONTINUED | OUTPATIENT
Start: 2022-09-25 | End: 2022-09-27 | Stop reason: HOSPADM

## 2022-09-25 RX ORDER — LISINOPRIL 5 MG/1
5 TABLET ORAL DAILY
Status: DISCONTINUED | OUTPATIENT
Start: 2022-09-25 | End: 2022-09-27 | Stop reason: HOSPADM

## 2022-09-25 RX ORDER — OXYCODONE HYDROCHLORIDE 5 MG/1
2.5 TABLET ORAL EVERY 4 HOURS PRN
Status: DISCONTINUED | OUTPATIENT
Start: 2022-09-25 | End: 2022-09-27 | Stop reason: HOSPADM

## 2022-09-25 RX ADMIN — ACETAMINOPHEN 650 MG: 325 TABLET, FILM COATED ORAL at 08:10

## 2022-09-25 RX ADMIN — PRAVASTATIN SODIUM 20 MG: 20 TABLET ORAL at 16:43

## 2022-09-25 RX ADMIN — LEVOTHYROXINE SODIUM 25 MCG: 25 TABLET ORAL at 05:28

## 2022-09-25 RX ADMIN — ERYTHROMYCIN 0.5 INCH: 5 OINTMENT OPHTHALMIC at 20:35

## 2022-09-25 RX ADMIN — NYSTATIN: 100000 POWDER TOPICAL at 16:43

## 2022-09-25 RX ADMIN — APIXABAN 5 MG: 5 TABLET, FILM COATED ORAL at 08:10

## 2022-09-25 RX ADMIN — SENNOSIDES 17.2 MG: 8.6 TABLET, FILM COATED ORAL at 08:10

## 2022-09-25 RX ADMIN — Medication 1 TABLET: at 08:10

## 2022-09-25 RX ADMIN — LEVETIRACETAM 500 MG: 500 TABLET, FILM COATED ORAL at 08:10

## 2022-09-25 RX ADMIN — APIXABAN 5 MG: 5 TABLET, FILM COATED ORAL at 16:43

## 2022-09-25 RX ADMIN — LEVETIRACETAM 500 MG: 500 TABLET, FILM COATED ORAL at 20:34

## 2022-09-25 NOTE — PLAN OF CARE
Problem: PHYSICAL THERAPY ADULT  Goal: Performs mobility at highest level of function for planned discharge setting  See evaluation for individualized goals  Description: Treatment/Interventions: Functional transfer training, LE strengthening/ROM, Therapeutic exercise, Endurance training, Bed mobility, Gait training, Equipment eval/education, OT          See flowsheet documentation for full assessment, interventions and recommendations  Note: Prognosis: Good  Problem List: Decreased strength, Decreased endurance, Impaired balance, Decreased mobility, Decreased cognition, Pain  Assessment: Pt is a 75 yo female admitted to [place] [address] on 9/24/2022 s/p acute change in mental status  DX: AMS, seizure, renal vein thrombosis, renal mass, hypothyroidism, hypertension, A fib, hx of CVA  Two patient identifiers were used to confirm  Pt is a resident at [facility]  Pt required A with ADL's and IADL's prior  Pt was able to transfer out of bed to her w/c without support  Pt states that she has not ambulates in a year  Will need to confirm PLOF due to the patient being a poor historian  Pt's impairments include reduced mobility, poor endurance, high risk of falling, confusion, poor activity tolerance, gait abnormalities  These impairments limit the ability of the patient to perform mobility without increased assistance, return to PLOF and participate in everyday life activities  Pt would benefit from continued skilled therapy while in the hospital to improve overall mobility and work towards a safe d/c  Recommend discharge to rehab  At the end of the session the patient was left in supine position with call bell and phone within reach  The patient's AM-PAC Basic Mobility Inpatient Short Form Raw Score is 13  A Raw score of less than or equal to 16 suggests the patient may benefit from discharge to post-acute rehabilitation services   Please also refer to the recommendation of the Physical Therapist for safe discharge planning  Barriers to Discharge: Decreased caregiver support     PT Discharge Recommendation: Post acute rehabilitation services    See flowsheet documentation for full assessment

## 2022-09-25 NOTE — ED PROVIDER NOTES
Emergency Department Airway Evaluation and Management Form    History  Obtained from: EMS  Patient has no known allergies  Chief Complaint   Patient presents with   Jaky Nine Fall     From [facility]      HPI    History reviewed  No pertinent past medical history  History reviewed  No pertinent surgical history  History reviewed  No pertinent family history  Social History     Tobacco Use    Smoking status: Never Smoker    Smokeless tobacco: Never Used   Substance Use Topics    Alcohol use: Not Currently    Drug use: Never     I have reviewed and agree with the history as documented      Review of Systems    Physical Exam  /80   Pulse 70   Temp 97 7 °F (36 5 °C)   Resp 16   Wt 72 kg (158 lb 11 7 oz)   SpO2 97%     Physical Exam    ED Medications  Medications   zonisamide (ZONEGRAN) capsule 100 mg (100 mg Oral Given 9/24/22 2126)   levETIRAcetam (KEPPRA) tablet 500 mg (500 mg Oral Given 9/25/22 0810)   albuterol (PROVENTIL HFA,VENTOLIN HFA) inhaler 2 puff (has no administration in time range)   bisacodyl (DULCOLAX) rectal suppository 10 mg (has no administration in time range)   calcium carbonate (TUMS) chewable tablet 500 mg (has no administration in time range)   guaiFENesin (ROBITUSSIN) oral solution 200 mg (has no administration in time range)   levothyroxine tablet 25 mcg (25 mcg Oral Given 9/25/22 0528)   pravastatin (PRAVACHOL) tablet 20 mg (20 mg Oral Given 9/25/22 1643)   magnesium hydroxide (MILK OF MAGNESIA) oral suspension 30 mL (has no administration in time range)   multivitamin-minerals (CENTRUM) tablet 1 tablet (1 tablet Oral Given 9/25/22 0810)   senna (SENOKOT) tablet 17 2 mg (17 2 mg Oral Refused 9/25/22 1714)   sodium phosphate-biphosphate (FLEET) enema 1 enema (has no administration in time range)   ondansetron (ZOFRAN) injection 4 mg (has no administration in time range)   erythromycin (ILOTYCIN) 0 5 % ophthalmic ointment 0 5 inch (0 5 inches Both Eyes Given 9/24/22 2127) apixaban (ELIQUIS) tablet 5 mg ( Oral Canceled Entry 9/25/22 1714)   acetaminophen (TYLENOL) tablet 975 mg (975 mg Oral Refused 9/25/22 1304)   lisinopril (ZESTRIL) tablet 5 mg (5 mg Oral Refused 9/25/22 1045)   oxyCODONE (ROXICODONE) IR tablet 2 5 mg (has no administration in time range)   nystatin (MYCOSTATIN) powder ( Topical Canceled Entry 9/25/22 1714)   iohexol (OMNIPAQUE) 350 MG/ML injection (SINGLE-DOSE) 100 mL (100 mL Intravenous Given 9/24/22 1253)       Intubation  Procedures    Notes  Patient s/p falls with altered mental status  Hemodynamically stable prior to arrival and in the trauma bay  No acute airway intervention required      Final Diagnosis  Final diagnoses:   AMS (altered mental status)   Kidney mass       ED Provider  Electronically Signed by     Delmi Mc MD  09/25/22 4944

## 2022-09-25 NOTE — PROGRESS NOTES
Progress Note - Tertiary Trauma [place]   Nydia Harding 76 y o  female MRN: [id number]  [facility] Encounter: [id number]    Summary of Diagnosed Injuries:  Small left forehead ecchymosis/swelling, otherwise no traumatic injuries    Clinical Plan:  Rest of management per medical team      Prophylaxis:  Eliquis    Disposition:  Admitted to Internal Medicine    Code status:  Level 3 - DNAR and DNI    Consultants:  Internal Medicine, Neurology      SUBJECTIVE:     Transfer from:  Trauma [place]  Mechanism of Injury:Fall    Chief Complaint:  AMS    HPI/Last 24 hour events: 76 y o  female presented with AMS, reportedly multiple falls in past week with seizure, refusing Keppra  Patient was brought to the ER due to AMS  In [address] [facility] patient wasconfused and only able to provide limited history, primarily complaining of headache  She has small area of swelling over left forehead with ecchymosis, otherwise no acute traumatic injuries  Patient was admitted to Medicine with Neurology consult  This morning patient similar to yesterday, limited ability to provide detailed history  Complaining of nonspecific diffuse pain, no acute changes from yesterday      Active medications:           Current Facility-Administered Medications:     acetaminophen (TYLENOL) tablet 975 mg, 975 mg, Oral, Q8H CYNDY    albuterol (PROVENTIL HFA,VENTOLIN HFA) inhaler 2 puff, 2 puff, Inhalation, Q6H PRN    apixaban (ELIQUIS) tablet 5 mg, 5 mg, Oral, BID    bisacodyl (DULCOLAX) rectal suppository 10 mg, 10 mg, Rectal, Daily PRN    calcium carbonate (TUMS) chewable tablet 500 mg, 500 mg, Oral, Daily PRN    erythromycin (ILOTYCIN) 0 5 % ophthalmic ointment 0 5 inch, 0 5 inch, Both Eyes, HS, 0 5 inch at 09/24/22 2127    guaiFENesin (ROBITUSSIN) oral solution 200 mg, 200 mg, Oral, TID PRN    levETIRAcetam (KEPPRA) tablet 500 mg, 500 mg, Oral, Q12H CYNDY, 500 mg at 09/25/22 0810    levothyroxine tablet 25 mcg, 25 mcg, Oral, Daily, 25 mcg at 09/25/22 0528    lisinopril (ZESTRIL) tablet 5 mg, 5 mg, Oral, Daily    magnesium hydroxide (MILK OF MAGNESIA) oral suspension 30 mL, 30 mL, Oral, Daily PRN    multivitamin-minerals (CENTRUM) tablet 1 tablet, 1 tablet, Oral, Daily, 1 tablet at 09/25/22 0810    ondansetron (ZOFRAN) injection 4 mg, 4 mg, Intravenous, Q6H PRN    oxyCODONE (ROXICODONE) IR tablet 2 5 mg, 2 5 mg, Oral, Q4H PRN    pravastatin (PRAVACHOL) tablet 20 mg, 20 mg, Oral, Daily With Dinner, 20 mg at 09/24/22 1755    senna (SENOKOT) tablet 17 2 mg, 2 tablet, Oral, BID, 17 2 mg at 09/25/22 0810    sodium phosphate-biphosphate (FLEET) enema 1 enema, 1 enema, Rectal, Daily PRN    zonisamide (ZONEGRAN) capsule 100 mg, 100 mg, Oral, HS, 100 mg at 09/24/22 2126      OBJECTIVE:     Vitals:   Vitals:    09/25/22 0646   BP: 157/65   Pulse: 60   Resp: 15   Temp: 97 8 °F (36 6 °C)   SpO2: 97%       Physical Exam:   GENERAL APPEARANCE:  No acute distress  NEURO:  Awake alert, following commands, moving all extremities  HEENT:  Left forehead swelling and ecchymosis  CV:  Regular rate and rhythm  LUNGS:  Clear to auscultation  GI:  Nondistended, nontender to palpation  MSK:  No lower extremity edema  SKIN:  Warm and dry      1  Before the illness or injury that brought you to the Emergency, did you need someone to help you on a regular basis? 1=Yes   2  Since the illness or injury that brought you to the Emergency, have you needed more help than usual to take care of yourself? 1=Yes   3  Have you been hospitalized for one or more nights during the past 6 months (excluding a stay in the Emergency Department)? 0=No   4  In general, do you see well? 0=Yes   5  In general, do you have serious problems with your memory? 1=Yes   6  Do you take more than three different medications everyday?  1=Yes   TOTAL   4     Did you order a geriatric consult if the score was 2 or greater?: no, deferred to Internal Medicine            I/O:   I/O       09/23 0701  09/24 0700 09/24 0701 09/25 0700 09/25 0701 09/26 0700    P  O    180    Total Intake(mL/kg)   180 (2 5)    Urine (mL/kg/hr)   300 (1 6)    Total Output   300    Net   -120           Unmeasured Urine Occurrence   2 x          Invasive Devices: Invasive Devices  Report    Peripheral Intravenous Line  Duration           Peripheral IV 09/24/22 Right Antecubital <1 day                  Imaging:   CTA head and neck w wo contrast    Result Date: 9/24/2022  Impression: Left frontal scalp hematoma without intracranial hemorrhage or calvarial fracture  Chronic microangiopathy changes  No cervical or intracranial large vessel occlusion, aneurysm or dissection injury  Moderate right ICA origin stenosis due to atherosclerotic disease  Left occipital sebaceous cyst    I personally discussed this study with Leila Tiwari on 9/24/2022 at 1:46 PM   Please note that there was difficulty to reach the trauma service immediately after interpretation as they were unavailable due to patient care duties  Workstation performed: [id number]     CT chest abdomen pelvis w contrast    Result Date: 9/24/2022  Impression: 1   6 cm solid mass at the upper pole of the left kidney worrisome for malignancy  The adjacent upper pole of the left kidney appears abnormal   It is uncertain whether this is due to diminished enhancement possibly from arterial involvement versus tumor infiltration  In addition, there is concern for thrombus or tumor thrombus in the left renal vein extending into the IVC  2   3 cm infrarenal abdominal aortic aneurysm  3   Cardiomegaly  I personally discussed this study with Leila Tiwari on 9/24/2022 at 1:32 PM  Workstation performed: [id number]     XR Trauma multiple (SLB/SLRA trauma bay ONLY)    Result Date: 9/24/2022  Impression: No acute cardiopulmonary disease within limitations of supine imaging   Workstation performed: [id number]     CT recon only cervical spine (No Charge)    Result Date: 9/24/2022  Impression: No cervical spine fracture or traumatic malalignment  Cervical spondylosis  I personally discussed this study with Sendy Roberto on 9/24/2022 at 1:48 PM   Please note that the trauma service was difficult to reach immediately after the interpretation of this study as they were involved in patient care duties  Workstation performed: [id number]     CT recon (no charge)    Result Date: 9/24/2022  Impression: No evidence of acute traumatic injury to the facial bones   I personally discussed this study with Sendy Roberto on 9/24/2022 at 1:55 PM  Workstation performed: [id number]       Labs:   CBC:   Lab Results   Component Value Date    WBC 8 52 09/24/2022    HGB 12 9 09/24/2022    HCT 38 09/24/2022    MCV 93 09/24/2022     09/24/2022    MCH 28 9 09/24/2022    MCHC 31 1 (L) 09/24/2022    RDW 15 3 (H) 09/24/2022    MPV 10 4 09/24/2022    NRBC 0 09/24/2022     CMP:   Lab Results   Component Value Date     (H) 09/24/2022    CO2 22 09/24/2022    CO2 23 09/24/2022    BUN 24 09/24/2022    CREATININE 1 14 09/24/2022    GLUCOSE 92 09/24/2022    CALCIUM 9 1 09/24/2022    EGFR 45 09/24/2022

## 2022-09-25 NOTE — PLAN OF CARE
Problem: MOBILITY - ADULT  Goal: Maintain or return to baseline ADL function  Description: INTERVENTIONS:  -  Assess patient's ability to carry out ADLs; assess patient's baseline for ADL function and identify physical deficits which impact ability to perform ADLs (bathing, care of mouth/teeth, toileting, grooming, dressing, etc )  - Assess/evaluate cause of self-care deficits   - Assess range of motion  - Assess patient's mobility; develop plan if impaired  - Assess patient's need for assistive devices and provide as appropriate  - Encourage maximum independence but intervene and supervise when necessary  - Involve family in performance of ADLs  - Assess for home care needs following discharge   - Consider OT consult to assist with ADL evaluation and planning for discharge  - Provide patient education as appropriate  Outcome: Progressing  Goal: Maintains/Returns to pre admission functional level  Description: INTERVENTIONS:  - Perform BMAT or MOVE assessment daily    - Set and communicate daily mobility goal to care team and patient/family/caregiver  - Collaborate with rehabilitation services on mobility goals if consulted  - Perform Range of Motion  times a day  - Reposition patient every  hours    - Dangle patient  times a day  - Stand patient  times a day  - Ambulate patient  times a day  - Out of bed to chair times a day   - Out of bed for meals  times a day  - Out of bed for toileting  - Record patient progress and toleration of activity level   Outcome: Progressing     Problem: PAIN - ADULT  Goal: Verbalizes/displays adequate comfort level or baseline comfort level  Description: Interventions:  - Encourage patient to monitor pain and request assistance  - Assess pain using appropriate pain scale  - Administer analgesics based on type and severity of pain and evaluate response  - Implement non-pharmacological measures as appropriate and evaluate response  - Consider cultural and social influences on pain and pain management  - Notify physician/advanced practitioner if interventions unsuccessful or patient reports new pain  Outcome: Progressing     Problem: INFECTION - ADULT  Goal: Absence or prevention of progression during hospitalization  Description: INTERVENTIONS:  - Assess and monitor for signs and symptoms of infection  - Monitor lab/diagnostic results  - Monitor all insertion sites, i e  indwelling lines, tubes, and drains  - Monitor endotracheal if appropriate and nasal secretions for changes in amount and color  - [place] appropriate cooling/warming therapies per order  - Administer medications as ordered  - Instruct and encourage patient and family to use good hand hygiene technique  - Identify and instruct in appropriate isolation precautions for identified infection/condition  Outcome: Progressing  Goal: Absence of fever/infection during neutropenic period  Description: INTERVENTIONS:  - Monitor WBC    Outcome: Progressing     Problem: SAFETY ADULT  Goal: Maintain or return to baseline ADL function  Description: INTERVENTIONS:  -  Assess patient's ability to carry out ADLs; assess patient's baseline for ADL function and identify physical deficits which impact ability to perform ADLs (bathing, care of mouth/teeth, toileting, grooming, dressing, etc )  - Assess/evaluate cause of self-care deficits   - Assess range of motion  - Assess patient's mobility; develop plan if impaired  - Assess patient's need for assistive devices and provide as appropriate  - Encourage maximum independence but intervene and supervise when necessary  - Involve family in performance of ADLs  - Assess for home care needs following discharge   - Consider OT consult to assist with ADL evaluation and planning for discharge  - Provide patient education as appropriate  Outcome: Progressing  Goal: Maintains/Returns to pre admission functional level  Description: INTERVENTIONS:  - Perform BMAT or MOVE assessment daily    - Set and communicate daily mobility goal to care team and patient/family/caregiver  - Collaborate with rehabilitation services on mobility goals if consulted  - Perform Range of Motion  times a day  - Reposition patient every  hours    - Dangle patient  times a day  - Stand patient  times a day  - Ambulate patient  times a day  - Out of bed to chair  times a day   - Out of bed for meals  times a day  - Out of bed for toileting  - Record patient progress and toleration of activity level   Outcome: Progressing  Goal: Patient will remain free of falls  Description: INTERVENTIONS:  - Educate patient/family on patient safety including physical limitations  - Instruct patient to call for assistance with activity   - Consult OT/PT to assist with strengthening/mobility   - Keep Call bell within reach  - Keep bed low and locked with side rails adjusted as appropriate  - Keep care items and personal belongings within reach  - Initiate and maintain comfort rounds  - Make Fall Risk Sign visible to staff  - Offer Toileting every  Hours, in advance of need  - Initiate/Maintain alarm  - Obtain necessary fall risk management equipment:   - Apply yellow socks and bracelet for high fall risk patients  - Consider moving patient to room near nurses station  Outcome: Progressing     Problem: DISCHARGE PLANNING  Goal: Discharge to home or other facility with appropriate resources  Description: INTERVENTIONS:  - Identify barriers to discharge w/patient and caregiver  - Arrange for needed discharge resources and transportation as appropriate  - Identify discharge learning needs (meds, wound care, etc )  - Arrange for interpretive services to assist at discharge as needed  - Refer to Case Management Department for coordinating discharge planning if the patient needs post-hospital services based on physician/advanced practitioner order or complex needs related to functional status, cognitive ability, or social support system  Outcome: Progressing Problem: Knowledge Deficit  Goal: Patient/family/caregiver demonstrates understanding of disease process, treatment plan, medications, and discharge instructions  Description: Complete learning assessment and assess knowledge base  Interventions:  - Provide teaching at level of understanding  - Provide teaching via preferred learning methods  Outcome: Progressing     Problem: NEUROSENSORY - ADULT  Goal: Achieves stable or improved neurological status  Description: INTERVENTIONS  - Monitor and report changes in neurological status  - Monitor vital signs such as temperature, blood pressure, glucose, and any other labs ordered   - Initiate measures to prevent increased intracranial pressure  - Monitor for seizure activity and implement precautions if appropriate      Outcome: Progressing  Goal: Remains free of injury related to seizures activity  Description: INTERVENTIONS  - Maintain airway, patient safety  and administer oxygen as ordered  - Monitor patient for seizure activity, document and report duration and description of seizure to physician/advanced practitioner  - If seizure occurs,  ensure patient safety during seizure  - Reorient patient post seizure  - Seizure pads on all 4 side rails  - Instruct patient/family to notify RN of any seizure activity including if an aura is experienced  - Instruct patient/family to call for assistance with activity based on nursing assessment  - Administer anti-seizure medications if ordered    Outcome: Progressing  Goal: Achieves maximal functionality and self care  Description: INTERVENTIONS  - Monitor swallowing and airway patency with patient fatigue and changes in neurological status  - Encourage and assist patient to increase activity and self care     - Encourage visually impaired, hearing impaired and aphasic patients to use assistive/communication devices  Outcome: Progressing     Problem: Potential for Falls  Goal: Patient will remain free of falls  Description: INTERVENTIONS:  - Educate patient/family on patient safety including physical limitations  - Instruct patient to call for assistance with activity   - Consult OT/PT to assist with strengthening/mobility   - Keep Call bell within reach  - Keep bed low and locked with side rails adjusted as appropriate  - Keep care items and personal belongings within reach  - Initiate and maintain comfort rounds  - Make Fall Risk Sign visible to staff  - Offer Toileting every  Hours, in advance of need  - Initiate/Maintain alarm  - Obtain necessary fall risk management equipment:   - Apply yellow socks and bracelet for high fall risk patients  - Consider moving patient to room near nurses station  Outcome: Progressing     Problem: Prexisting or High Potential for Compromised Skin Integrity  Goal: Skin integrity is maintained or improved  Description: INTERVENTIONS:  - Identify patients at risk for skin breakdown  - Assess and monitor skin integrity  - Assess and monitor nutrition and hydration status  - Monitor labs   - Assess for incontinence   - Turn and reposition patient  - Assist with mobility/ambulation  - Relieve pressure over bony prominences  - Avoid friction and shearing  - Provide appropriate hygiene as needed including keeping skin clean and dry  - Evaluate need for skin moisturizer/barrier cream  - Collaborate with interdisciplinary team   - Patient/family teaching  - Consider wound care consult   Outcome: Progressing

## 2022-09-25 NOTE — ASSESSMENT & PLAN NOTE
· Patient was transferred to the hospital from [facility] due to altered mental status after a fall 2 days  ago  · Patient with history of seizure disorder  · Patient was evaluated by trauma    Had CTA head and neck-unremarkable  · Neurology evaluation appreciated  · Patient had a seizure-like activity lasting approximately 5 minutes  and patient was taken to [facility] and she refused to stay and signed AMA  · Evaluated by Neurology with concern of having breakthrough seizure secondary to medication noncompliance  · Started empirically on zonisamide, continue Keppra  · Brain MRI was ordered by Neurology however patient has a pacemaker  · No signs of infection  · Neurology is following

## 2022-09-25 NOTE — H&P
H&P - Trauma   Maury Hines 76 y o  female MRN: [id number]  Unit/Bed#: [facility] 706-01 Encounter: [id number]    Trauma Alert: Level B   Model of Arrival: Ambulance    Trauma Team: Attending Iglesia Dumont  Consultants:  Neurology, Internal Medicine    Assessment/Plan   Active Problems / Assessment:   AMS  Kidney mass  No acute traumatic injuries     Plan:   AMS  -CTH/CTA without acute abnormalities  -neurology consulted in setting of recent seizures and history of stroke  -admit to internal medicine for further workup    Kidney mass  -CT with left kidney mass concerning for malignancy, discussed with patient   -admit to medicine for further workup    History of Present Illness     Chief Complaint: AMS  Mechanism:Fall     HPI:    Maury Hines is a 76 y o  female who presents with AMS  Per EMS patient reportedly had a fall 1 week ago with head strike on Eliquis with seizure and was evaluated at another hospital, patient reportedly left AMA  Patient reportedly had another fall with head strike 2 days ago but refused coming to the hospital and has been refusing Keppra  Today patient was brought to the ER due to AMS  In [facility] e [facility] patient is confused and only able to provide limited history, primarily complaining of headache  Patient has history of CVA with residual left-sided weakness, patient states it is at her normal baseline weakness without new changes  Review of Systems   Unable to perform ROS: Mental status change     12-point, complete review of systems was reviewed and negative except as stated above  Historical Information   Past medical history:  Stroke  Seizures    Social History     Tobacco Use    Smoking status: Never Smoker    Smokeless tobacco: Never Used   Substance Use Topics    Alcohol use: Not Currently    Drug use: Never       There is no immunization history on file for this patient  Last Tetanus: N/A  Family History: Non-contributory    1   Before the illness or injury that brought you to the Emergency, did you need someone to help you on a regular basis? 1=Yes   2  Since the illness or injury that brought you to the Emergency, have you needed more help than usual to take care of yourself? 1=Yes   3  Have you been hospitalized for one or more nights during the past 6 months (excluding a stay in the Emergency Department)? 0=No   4  In general, do you see well? 0=Yes   5  In general, do you have serious problems with your memory? 1=Yes   6  Do you take more than three different medications everyday? 1=Yes   TOTAL   4     Did you order a geriatric consult if the score was 2 or greater?: no, defer to Internal Medicine     Meds/Allergies   all current active meds have been reviewed   No Known Allergies    Objective   Initial Vitals:   Temperature: 98 6 °F (37 °C) (09/24/22 1209)  Pulse: 71 (09/24/22 1209)  Respirations: 20 (09/24/22 1209)  Blood Pressure: 133/66 (09/24/22 1209)    Primary Survey:   Airway:        Status: patent;        Pre-hospital Interventions: none        Hospital Interventions: none  Breathing:        Pre-hospital Interventions: none       Effort: normal       Right breath sounds: normal       Left breath sounds: normal  Circulation:        Rhythm: regular no murmur       Rate: regular   Right Pulses Left Pulses    R radial: 2+    R pedal: 2+     L radial: 2+    L pedal: 2+       Disability:        GCS: Eye: 4; Verbal: 4 Motor: 6 Total: 14       Right Pupil: 3 mm;  round;  reactive         Left Pupil:  3 mm;  round;  reactive      R Motor Strength L Motor Strength    R : 5/5  R dorsiflex: 5/5  R plantarflex: 5/5 L : 4/5  L dorsiflex: 4/5  L plantarflex: 4/5        Sensory:  No sensory deficit  Exposure:       Completed: Yes      Secondary Survey:  Physical Exam  Vitals and nursing note reviewed  Constitutional:       General: She is not in acute distress  HENT:      Head: Normocephalic        Comments: Small area of swelling and ecchymosis over left forehead Mouth/Throat:      Mouth: Mucous membranes are moist       Pharynx: Oropharynx is clear  Eyes:      Extraocular Movements: Extraocular movements intact  Pupils: Pupils are equal, round, and reactive to light  Cardiovascular:      Rate and Rhythm: Normal rate and regular rhythm  Heart sounds: No murmur heard  Pulmonary:      Effort: Pulmonary effort is normal  No respiratory distress  Breath sounds: Normal breath sounds  No wheezing, rhonchi or rales  Abdominal:      General: Abdomen is flat  There is no distension  Palpations: Abdomen is soft  Tenderness: There is no abdominal tenderness  There is no guarding or rebound  Musculoskeletal:      Cervical back: No tenderness  Comments: No midline C, T, or L spinal tenderness, step-offs, or deformities  Pelvis stable, hips nontender  No tenderness throughout extremities  Skin:     General: Skin is warm and dry  Neurological:      Mental Status: She is alert  Sensory: No sensory deficit  Comments: GCS 14, mild confusion  Left arm and leg weakness, slight left nasolabial fold flattening (chronic)         Invasive Devices  Report    Peripheral Intravenous Line  Duration           Peripheral IV 09/24/22 Right Antecubital <1 day              Lab Results: Results: I have personally reviewed all pertinent laboratory/tests results    Imaging Results: I have personally reviewed pertinent reports  Chest Xray(s): negative for acute findings   FAST exam(s): negative for acute findings   CT Scan(s): positive for acute findings: Kidney mass   Additional Xray(s): N/A         Code Status: Level 3 - DNAR and DNI  Advance Directive and Living Will:      Power of :    POLST:    I have spent 30 minutes with Patient  today in which greater than 50% of this time was spent in counseling/coordination of care regarding Diagnostic results, Intructions for management and Impressions

## 2022-09-25 NOTE — PHYSICAL THERAPY NOTE
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[facility] )       Past Medical History  History reviewed  No pertinent past medical history  Past Surgical History  History reviewed  No pertinent surgical history  09/25/22 0912   PT Last Visit   PT Visit Date 09/25/22   Note Type   Note type Evaluation   Pain Assessment   Pain Assessment Tool FLACC   Pain Rating: FLACC (Rest) - Face 1   Pain Rating: FLACC (Rest) - Legs 1   Pain Rating: FLACC (Rest) - Activity 1   Pain Rating: FLACC (Rest) - Cry 1   Pain Rating: FLACC (Rest) - Consolability 1   Score: FLACC (Rest) 5   Pain Rating: FLACC (Activity) - Face 1   Pain Rating: FLACC (Activity) - Legs 1   Pain Rating: FLACC (Activity) - Activity 1   Pain Rating: FLACC (Activity) - Cry 1   Pain Rating: FLACC (Activity) - Consolability 1   Score: FLACC (Activity) 5   Restrictions/Precautions   Weight Bearing Precautions Per Order No   Other Precautions Cognitive; Impulsive; Chair Alarm; Bed Alarm;Multiple lines; Fall Risk;Pain   Home Living   Type of Home   ([address])   Home Layout One level   Additional Comments Pt is a resident at the [address]  Pt was I for ADL's and IADL's prior  Pt is retired and reports a few falls   Per patient she transfer by herself into the w/c from her bed  will need to confirm due to the patient being a poor historian   Prior Function   Level of [place] Needs assistance with ADLs and functional mobility   Lives With Facility staff   ADL Assistance Needs assistance   IADLs Needs assistance   Falls in the last 6 months 1 to 4   Vocational Retired   Jammie Family/Caregiver Present No   Cognition   Overall Cognitive Status Impaired   Attention Attends with cues to redirect   Orientation Level Oriented to person;Oriented to place; Disoriented to time;Disoriented to situation   RLE Assessment   RLE Assessment   (grossly 3/5 observed with mobility)   LLE Assessment   LLE Assessment   (grossly 3/5 observed with mobiltiy)   Bed Mobility   Supine to Sit 4  Minimal assistance   Additional items Assist x 1   Sit to Supine 4  Minimal assistance   Additional items Assist x 1   Additional Comments sitting EOB at a CGA level   Transfers   Sit to Stand 4  Minimal assistance   Additional items Assist x 1   Stand to Sit 4  Minimal assistance   Additional items Assist x 1   Ambulation/Elevation   Gait pattern Excessively slow; Step to;Shuffling   Gait Assistance 3  Moderate assist   Additional items Assist x 1   Assistive Device Other (Comment)  (BUE HHA)   Distance 3ft to [facility]   Balance   Static Sitting Fair   Dynamic Sitting Fair -   Static Standing Poor +   Dynamic Standing Poor +   Ambulatory Poor   Endurance Deficit   Endurance Deficit Yes   Activity Tolerance   Activity Tolerance Patient limited by fatigue;Patient limited by pain   Medical Staff Made Aware OT   Nurse Made Aware nurse approved therapy session   Assessment   Prognosis Good   Problem List Decreased strength;Decreased endurance; Impaired balance;Decreased mobility; Decreased cognition;Pain   Assessment Pt is a 75 yo female [facility]22 s/p acute change in mental status  DX: AMS, seizure, renal vein thrombosis, renal mass, hypothyroidism, hypertension, A fib, hx of CVA  Two patient identifiers were used to confirm  Pt is a resident at [facility]  Pt required A with ADL's and IADL's prior  Pt was able to transfer out of bed to her w/c without support  Pt states that she has not ambulates in a year  Will need to confirm PLOF due to the patient being a poor historian   Pt's impairments include reduced mobility, poor endurance, high risk of falling, confusion, poor activity tolerance, gait abnormalities  These impairments limit the ability of the patient to perform mobility without increased assistance, return to PLOF and participate in everyday life activities  Pt would benefit from continued skilled therapy while in the hospital to improve overall mobility and work towards a safe d/c  Recommend discharge to rehab  At the end of the session the patient was left in supine position with call bell and phone within reach  The patient's AM-PAC Basic Mobility Inpatient Short Form Raw Score is 13  A Raw score of less than or equal to 16 suggests the patient may benefit from discharge to post-acute rehabilitation services  Please also refer to the recommendation of the Physical Therapist for safe discharge planning  Barriers to Discharge Decreased caregiver support   Goals   STG Expiration Date 10/09/22   Short Term Goal #1 STG 1: Pt will perform transfers at a S level to return to baseline of function  STG 2: Pt will ambulate 5ft with S at a RW level to reduce the level of assistance needed upon d/c home  STG 3: Pt will perform bed mobility at a S to safety return to PLOF  STG 4: Pt will perform w/c mobility for 150ft at a I level to work towards prior mobility  PT Treatment Day 0   Plan   Treatment/Interventions Functional transfer training;LE strengthening/ROM; Therapeutic exercise; Endurance training;Bed mobility;Gait training;Equipment eval/education;OT   PT Frequency 3-5x/wk   Recommendation   PT Discharge Recommendation Post acute rehabilitation services   AM-PAC Basic Mobility Inpatient   Turning in Bed Without Bedrails 3   Lying on Back to Sitting on Edge of Flat Bed 3   Moving Bed to Chair 2   Standing Up From Chair 3   Walk in Room 1   Climb 3-5 Stairs 1   Basic Mobility Inpatient Raw Score 13   Basic Mobility Standardized Score 33 99   Highest Level Of Mobility   [facility] Goal 4: Move to chair/commode PAXTON Achieved 4: Move to chair/commode   Indu Mancia, PT, DPT

## 2022-09-25 NOTE — PROGRESS NOTES
Patient refused am blood work 9/25  She states she does not want any blood work and wants to leave to go back to [address] [place] [address]

## 2022-09-25 NOTE — ASSESSMENT & PLAN NOTE
· Patient had a CT abdomen as part of the trauma protocol-CT scan showed 6 cm renal mass   6 cm solid mass at the upper pole of the left kidney worrisome for malignancy  The adjacent upper pole of the left kidney appears abnormal   It is uncertain whether this is due to diminished enhancement possibly from arterial involvement versus tumor infiltration  In addition, there is concern for thrombus or tumor thrombus in the left renal vein extending into the IVC  · Patient refuses urology evaluation  · Patient is on Eliquis but she is only taking it once a day-  · Change to b i d

## 2022-09-25 NOTE — ASSESSMENT & PLAN NOTE
· Patient with known history of seizure disorder followed by Dr Chance Cedeño with [facility]  · Patient refused seizure medication in the past  · Supposed to be on Keppra 500 mg p o  B i d -but has been refusing  As per [facility] staff patient to last 5 doses of Keppra  · Neurology is adding  zonisamide 100 mg HS for 1 week and then 200 mg HS for 1 week and 300 mg HS thereafter    Once the zonisamide level is therapeutic can discontinue Keppra  · Will obtain  capacity evaluation

## 2022-09-25 NOTE — PROCEDURES
POC FAST US    Date/Time: 9/24/2022 12:15 PM  Performed by: Malena Woodson MD  Authorized by: Malena Woodson MD     Patient location:  Trauma  Procedure details:     Exam Type:  Diagnostic    Indications: blunt abdominal trauma      Assess for:  Intra-abdominal fluid and pericardial effusion    Technique: FAST      Views obtained:  Heart - Pericardial sac, RUQ - Robbins's Pouch, LUQ - Splenorenal space and Suprapubic - Pouch of Ernie    Image quality: diagnostic      Image availability:  Images available in PACS  FAST Findings:     RUQ (Hepatorenal) free fluid: absent      LUQ (Splenorenal) free fluid: absent      Suprapubic free fluid: absent      Cardiac wall motion: identified      Pericardial effusion: absent    Interpretation:     Impressions: negative

## 2022-09-25 NOTE — PROGRESS NOTES
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, HTN, HLD, Afib on Eliquis and hypothyroidism who presented to [facility] with altered mental status  Patient sustained a fall x 2 days ago and refused to seek medical attention at that time, however staff RN Michelle at [facility] reports change in mental status since this time (delayed speech, delayed motor movements, worsening left sided weakness, pocketing medication/food)  Per EMS report, patient is confused and has worsening left sided weakness, although patient states this is her baseline strength  Upon arrival to ED, systolic BP ranged from 306-971  GCS 14  Labs unremarkable  CTH/CTA head/neck unremarkable  CT C/A/P revealed 6 cm solid mass of left kidney, worrisome for malignancy, as well as a concern for thrombus vs tumor thrombus in L renal vein extending into IVC and 3 cm infrarenal AAA  Patient sustained an unwitnessed fall with + head strike and "seizure like activity" lasting approx  5 minutes with teeth/hand clenching x 1 week ago  She was taken to [facility], however she refused treatment signed out AMA  She had a second fall x 2 days ago, no reported seizure like activity but refused to seek medical treatment at that time  Concern that patient is having breakthrough seizures secondary to medication noncompliance, espeically since she's had 2 falls within the last week       Plan:  - Initiate Zonisamide 100 mg HS x 1 week, then 200 mg HS x 1 week, then 300 mg HS thereafter   - Continue Keppra 500 mg BID until therapeutic Zonisamide level, then can transition to Zonisamide monotherapy   - Continue with Eliquis 5 mg BID   - MRI brain with and without contrast pending (given possible renal malignancy seen on CT)   - No need for EEG at this time per attending neurologist   - Recommend evaluation for capacity as patient has limited insight on health history and has been documented to be an unreliable historian   - Recommend checking for other metabolic derangements that could be causing altered mentation   - Recommend heme/onc consult for possible malignancy seen on CT C/A/P  - PT/OT therapies   - Medical management and supportive care per primary team  Correction of any metabolic or infectious disturbances  Plan discussed with Attending Neurologist, please see attestation for further input/recommendations  Seizure [facility])  Assessment & Plan  - Patient with known seizure history  - Recently evaluated by Dr Chon Eid at the [facility] Epilepsy on 06/02/2022 for seizures  It is documented that patient had a known history of seizures and refused AED medication in the past, however patient was documented to be a poor historian  She was instructed to initiate Keppra 500 mg BID, however patient reported that the medication made her drowsy and caused headaches  It was discovered at that time that she had trialed Keppra and Depakote in the past  Patient was then supposed to transition to Zonisamide 300 mg HS, however after speaking with staff RN Mayra Shultz at [facility], patient did not initiate Ishmael Nanas and only has an order for Keppra 500 mg BID (patient has been refusing medication however has taken the last 5 doses)  - See plan above       Recommendations for outpatient neurological follow up have yet to be determined  Subjective:   Patient has had no further seizures this admission  History reviewed  No pertinent past medical history  History reviewed  No pertinent surgical history  History reviewed  No pertinent family history    Social History     Socioeconomic History    Marital status:      Spouse name: None    Number of children: None    Years of education: None    Highest education level: None   Occupational History    None   Tobacco Use    Smoking status: Never Smoker    Smokeless tobacco: Never Used   Substance and Sexual Activity    Alcohol use: Not Currently    Drug use: Never    Sexual activity: Not Currently   Other Topics Concern    None   Social History Narrative    None     Social Determinants of Health     Financial Resource Strain: Not on file   Food Insecurity: Not on file   Transportation Needs: Not on file   Physical Activity: Not on file   Stress: Not on file   Social Connections: Not on file   Intimate Partner Violence: Not on file   Housing Stability: Not on file       Medications: Reviewed in detail by me  ROS: Review of Systems   Unable to perform ROS: Other (patient refused to answer ROS questions )        Vitals: /65   Pulse 60   Temp 97 8 °F (36 6 °C)   Resp 15   Wt 72 kg (158 lb 11 7 oz)   SpO2 97%     Physical Exam:   Physical Exam  Vitals reviewed  Constitutional:       General: She is not in acute distress  Appearance: She is normal weight  She is not ill-appearing  HENT:      Head: Normocephalic  Comments: Left facial swelling with bruising      Right Ear: External ear normal       Left Ear: External ear normal       Nose: Nose normal       Mouth/Throat:      Mouth: Mucous membranes are moist    Eyes:      General:         Right eye: No discharge  Left eye: No discharge  Extraocular Movements: Extraocular movements intact  Conjunctiva/sclera: Conjunctivae normal       Pupils: Pupils are equal, round, and reactive to light  Comments: Left periorbital swelling with bruising and L eyebrow healing laceration    Cardiovascular:      Rate and Rhythm: Normal rate  Pulmonary:      Effort: Pulmonary effort is normal  No respiratory distress  Musculoskeletal:         General: No tenderness  Normal range of motion  Cervical back: Normal range of motion  No rigidity  Right lower leg: No edema  Left lower leg: No edema  Skin:     General: Skin is warm and dry  Coloration: Skin is not jaundiced or pale  Findings: Bruising present  Neurological:      Mental Status: She is alert  Coordination: Finger-nose-finger test: normal on R extremity, unable to complete on L  Heel-to-shin test: refused to participate       Comments: See below    Psychiatric:      Comments: Agitated, hostile, mistrustful        Neurologic Exam     Mental Status     Patient is alert, oriented to self/place/date  Follows simple commands with redirection and encouragement  Speech is clear  Able to name objects (glasses, glove)  Patient continues to be agitated, hostile, guarded  Cranial Nerves     CN III, IV, VI   Pupils are equal, round, and reactive to light  Nystagmus: none   Diplopia: none  Conjugate gaze: present    CN V   Facial sensation intact  CN VII   Facial expression full, symmetric  CN VIII   CN VIII normal      CN IX, X   CN IX normal      CN XII   CN XII normal      Patient refused to participate in visual field testing, however blink to threat intact bilaterally        Motor Exam   Muscle bulk: normal  Overall muscle tone: normal  Right upper extremity: 4+/5  strength, able to lift off the bed and maintain antigravity, did not provide resistance     Left upper extremity: 3/5  strength, able to lift forearm off bed and maintain antigravity, unable to lift entire extremity antigravity     Right lower extremity: able to maintain antigravity >5 seconds, did not provide resistance (patient stated she was too tired to continue motor strength testing)     Left lower extremity: unable to lift antigravity, able to wiggle toes      Sensory Exam   Light touch normal      Gait, Coordination, and Reflexes     Gait  Gait: (deferred for safety)    Coordination   Finger-nose-finger test: normal on R extremity, unable to complete on L   Heel-to-shin test: refused to participate     Tremor   Resting tremor: absent  Negative babinski   Negative ankle clonus        Labs: Reviewed in detail by me  Imaging: I have personally reviewed pertinent imaging and PACS reports  VTE Prophylaxis: Sequential compression device (Venodyne)     Total time spent today 24 minutes  Greater than 50% of total time was spent with the patient and/or family counseling and/or coordination of care  A description of the counseling/coordination of care:  Patient was seen and evaluated  Discussed with attending  Chart reviewed thoroughly including laboratory and imaging studies  Discussed medication regimen, imaging reviewed   Plan of care discussed with patient and primary team

## 2022-09-25 NOTE — PROGRESS NOTES
[facility]  Progress Note - Ethyl Saint 1945, 76 y o  female MRN: [id number]  Unit/Bed#: [facility] 706-01 Encounter: [id number]  Primary Care Provider: Ganga Díaz MD   Date and time admitted to hospital: 9/24/2022 12:10 PM    * Altered mental status, unspecified  Assessment & Plan  · [place] [facility] to altered mental status after a fall 2 days  ago  · Patient with history of seizure disorder  · Patient was evaluated by trauma  Had CTA head and neck-unremarkable  · Neurology evaluation appreciated  · Patient had a seizure-like activity lasting approximately 5 minutes  and patient was taken to [facility] and she refused to stay and signed AMA  · Evaluated by Neurology with concern of having breakthrough seizure secondary to medication noncompliance  · Started empirically on zonisamide, continue Keppra  · Brain MRI was ordered by Neurology however patient has a pacemaker  · No signs of infection  · Neurology is following    Seizure [facility])  Assessment & Plan  · Patient with known history of seizure disorder followed by Dr Ty Nguyen with [facility]  · Patient refused seizure medication in the past  · Supposed to be on Keppra 500 mg p o  B i d -but has been refusing  As per thin nursing home staff patient to last 5 doses of Keppra  · Neurology is adding  zonisamide 100 mg HS for 1 week and then 200 mg HS for 1 week and 300 mg HS thereafter  Once the zonisamide level is therapeutic can discontinue Keppra  · Will obtain  capacity evaluation    Renal vein thrombosis (HCC)  Assessment & Plan  CT scan finding, there is concern for thrombus or tumor thrombus in the left renal vein extending into the IVC  Suspect secondary to underlying renal mass  Change Eliquis to b i d      Renal mass  Assessment & Plan  · Patient had a CT abdomen as part of the trauma protocol-CT scan showed 6 cm renal mass   6 cm solid mass at the upper pole of the left kidney worrisome for malignancy  The adjacent upper pole of the left kidney appears abnormal   It is uncertain whether this is due to diminished enhancement possibly from arterial involvement versus tumor infiltration  In addition, there is concern for thrombus or tumor thrombus in the left renal vein extending into the IVC  · Patient refuses urology evaluation  · Patient is on Eliquis but she is only taking it once a day-  · Change to b i d  Hypothyroidism  Assessment & Plan  · Continue Synthroid  · Check TSH    Hyperlipidemia  Assessment & Plan  Continue statin    Hypertension  Assessment & Plan  · Monitor blood pressure  · Do not appear to be any antihypertensives as outpatient  · Start lisinopril    Atrial fibrillation (HCC)  Assessment & Plan  · Paroxysmal atrial fibrillation  Anticoagulated with Eliquis  By reviewing the nursing home records patient is only getting Eliquis once a day  · Patient with pacemaker    History of CVA (cerebrovascular accident)  Assessment & Plan  · Left-sided weakness  · Patient reported that she uses wheelchair  · PT OT evaluation        VTE Pharmacologic Prophylaxis:   High Risk (Score >/= 5) - Pharmacological DVT Prophylaxis Ordered: apixaban (Eliquis)  Sequential Compression Devices Ordered  Patient Centered Rounds: I performed bedside rounds with nursing staff today  Discussions with Specialists or Other Care Team Provider:     Education and Discussions with Family / Patient: Updated  (sister) via phone  Time Spent for Care: 45 minutes  More than 50% of total time spent on counseling and coordination of care as described above  Current Length of Stay: 1 day(s)  Current Patient Status: Inpatient   Certification Statement: The patient will continue to require additional inpatient hospital stay due to Management of breakthrough seizure  Discharge Plan: Anticipate discharge in >72 hrs to rehab facility      Code Status: Level 3 - DNAR and DNI    Subjective:   Patient seen examined  Complaining of pain all over with headache and dizziness  No chest pain or shortness of breath  No nausea vomiting or diarrhea    Refusing blood work today    Objective:     Vitals:   Temp (24hrs), Av °F (36 7 °C), Min:97 7 °F (36 5 °C), Max:98 6 °F (37 °C)    Temp:  [97 7 °F (36 5 °C)-98 6 °F (37 °C)] 97 8 °F (36 6 °C)  HR:  [60-76] 60  Resp:  [15-22] 15  BP: (133-192)/(65-99) 157/65  SpO2:  [96 %-98 %] 97 %  There is no height or weight on file to calculate BMI  Input and Output Summary (last 24 hours):      Intake/Output Summary (Last 24 hours) at 2022 0911  Last data filed at 2022 0801  Gross per 24 hour   Intake 180 ml   Output 300 ml   Net -120 ml       Physical Exam:   Physical Exam   Patient is awake alert in no acute distress  Periorbital ecchymosis bilateral  Comfortable in bed  Lung clear to auscultation bilateral  Heart positive S1 S2 no murmur  Abdomen soft nontender  Lower extremities no edema    Additional Data:     Labs:  Results from last 7 days   Lab Units 22  1229 22  1223   WBC Thousand/uL  --  8 52   HEMOGLOBIN g/dL  --  12 7   I STAT HEMOGLOBIN g/dl 12 9  --    HEMATOCRIT %  --  40 9   HEMATOCRIT, ISTAT % 38  --    PLATELETS Thousands/uL  --  244   NEUTROS PCT %  --  61   LYMPHS PCT %  --  28   MONOS PCT %  --  9   EOS PCT %  --  2     Results from last 7 days   Lab Units 22  1229 22  1223   SODIUM mmol/L  --  141   POTASSIUM mmol/L  --  4 4   CHLORIDE mmol/L  --  113*   CO2 mmol/L  --  23   CO2, I-STAT mmol/L 22  --    BUN mg/dL  --  24   CREATININE mg/dL  --  1 14   ANION GAP mmol/L  --  5   CALCIUM mg/dL  --  9 1   GLUCOSE RANDOM mg/dL  --  89                       Lines/Drains:  Invasive Devices  Report    Peripheral Intravenous Line  Duration           Peripheral IV 22 Right Antecubital <1 day                      Imaging:  Reviewed    Recent Cultures (last 7 days): Last 24 Hours Medication List:   Current Facility-Administered Medications   Medication Dose Route Frequency Provider Last Rate    acetaminophen  975 mg Oral [facility] Alexsander Armenta DO      albuterol  2 puff Inhalation Q6H PRN Getachew Edwards MD      apixaban  5 mg Oral BID Alexsander Armenta DO      bisacodyl  10 mg Rectal Daily PRN Getachew Edwards MD      calcium carbonate  500 mg Oral Daily PRN Getachew Edwards MD      erythromycin  0 5 inch Both Eyes HS Getachew Edwards MD      guaiFENesin  200 mg Oral TID PRN Getachew Edwards MD      levETIRAcetam  500 mg Oral Q12H Damaris Caballero MD      levothyroxine  25 mcg Oral Daily Getachew Edwards MD      lisinopril  5 mg Oral Daily Alexsander Armenta DO      magnesium hydroxide  30 mL Oral Daily PRN Getachew Edwards MD      multivitamin-minerals  1 tablet Oral Daily Getachew Edwards MD      ondansetron  4 mg Intravenous Q6H PRN Getachew Edwards MD      oxyCODONE  2 5 mg Oral Q4H PRN Alexsander Armenta DO      pravastatin  20 mg Oral Daily With Macy Valladares MD      senna  2 tablet Oral BID Getachew Edwards MD      sodium phosphate-biphosphate  1 enema Rectal Daily PRN Getachew Edwards MD      zonisamide  100 mg Oral HS Getachew Edwards MD          Today, Patient Was Seen By: Alexsander Armenta DO    **Please Note: This note may have been constructed using a voice recognition system  **

## 2022-09-25 NOTE — NURSING NOTE
Pt refusing to take lisinopril  Pt educated that medication is for high blood pressure  Pt educated on risks of uncontrolled high blood pressure  Pt states "I never needed that before Im not going to take it now " pt vitals reviewed with patient  Pt continues to refuse and states "I dont believe that  I just came here for my head and I dont need that"    Pt continues to have multiple complaints and be uncooperative and verbally abusive to staff

## 2022-09-25 NOTE — ASSESSMENT & PLAN NOTE
CT scan finding, there is concern for thrombus or tumor thrombus in the left renal vein extending into the IVC  Suspect secondary to underlying renal mass  Change Eliquis to b i d

## 2022-09-25 NOTE — PLAN OF CARE
Problem: OCCUPATIONAL THERAPY ADULT  Goal: Performs self-care activities at highest level of function for planned discharge setting  See evaluation for individualized goals  Description: Treatment Interventions: ADL retraining, Functional transfer training, UE strengthening/ROM, Endurance training, Cognitive reorientation, Patient/family training, Equipment evaluation/education, Compensatory technique education, Continued evaluation, Energy conservation, Activityengagement          See flowsheet documentation for full assessment, interventions and recommendations  Note: Limitation: Decreased ADL status, Decreased Safe judgement during ADL, Decreased cognition, Decreased endurance, Decreased self-care trans, Decreased high-level ADLs  Prognosis: Fair  Assessment: Pt is a 77 y/o female seen for OT eval s/p adm to [facility] w/ AMS, and multiple falls over the past week w/ seizure  Pt  has no past medical history on file  Pt with active OT orders and up with assistance  orders  Pt lives with facility staff @ [facility]  Pt reports being I w/ dressing, has assist for bathing 2x/wk and assist for IADLS (meals provided, cleaning/laundry services provided), does not drive, & required use of DME PTA including a w/c; (pt overall is a poor historian; will need to confirm living arrangements and PLOF information)  Pt is currently demonstrating the following occupational deficits: Min A UB ADLS, Mod A LB ADLS, Min A bed mobility, Min A transfers and Mod A functional mobility w/ SBA 2nd for safety  These deficits that are impacting pt's baseline areas of occupation are a result of the following impairments: pain, endurance, activity tolerance, functional mobility, forward functional reach, balance, trunk control, functional standing tolerance, decreased I w/ ADLS/IADLS, cognitive impairments, decreased safety awareness and decreased insight into deficits  The following Occupational Performance Areas to address include: bathing/shower, toilet hygiene, dressing, health maintenance, functional mobility and clothing management  Recommend inpatient rehab  upon D/C (pending confirmation of pt's baseline functional status and amount of help usually provided to pt at [facility]; if pt is close to/at baseline then may be able to D/C back to [facility] w/ prior level of assist)   Pt to continue to benefit from acute immediate OT services to address the following goals 3-5x/week to  w/in 10-14 days:     OT Discharge Recommendation: Post acute rehabilitation services     Prosper Rojas MS, OTR/L

## 2022-09-25 NOTE — ASSESSMENT & PLAN NOTE
· Monitor blood pressure  · Do not appear to be any antihypertensives as outpatient  · Start lisinopril

## 2022-09-25 NOTE — OCCUPATIONAL THERAPY NOTE
Occupational Therapy Evaluation     Patient Name: Siri Bosworth  Today's Date: 9/25/2022  Problem List  Principal Problem:    Altered mental status, unspecified  Active Problems:    Seizure ([facility] )    Renal mass    History of CVA (cerebrovascular accident)    Atrial fibrillation ([facility] )    Hypertension    Hyperlipidemia    Hypothyroidism    [facility] ([facility] )    Past Medical History  History reviewed  No pertinent past medical history  Past Surgical History  History reviewed  No pertinent surgical history  09/25/22 0911   OT Last Visit   OT Visit Date 09/25/22   Note Type   Note type Evaluation   Restrictions/Precautions   Weight Bearing Precautions Per Order No   Other Precautions Cognitive; Impulsive;Agitated; Bed Alarm;Multiple lines; Fall Risk;Pain   Pain Assessment   Pain Assessment Tool FLACC   Pain Rating: FLACC (Rest) - Face 1   Pain Rating: FLACC (Rest) - Legs 1   Pain Rating: FLACC (Rest) - Activity 1   Pain Rating: FLACC (Rest) - Cry 1   Pain Rating: FLACC (Rest) - Consolability 1   Score: FLACC (Rest) 5   Pain Rating: FLACC (Activity) - Face 1   Pain Rating: FLACC (Activity) - Legs 1   Pain Rating: FLACC (Activity) - Activity 1   Pain Rating: FLACC (Activity) - Cry 1   Pain Rating: FLACC (Activity) - Consolability 1   Score: FLACC (Activity) 5   Home Living   Type of Home Other (Comment)  ([facility])   Home Layout One level   Bathroom Shower/Tub Walk-in shower   Bathroom Toilet Raised   Bathroom Equipment Grab bars in shower; Shower chair;Grab bars around toilet   [facility]   Additional Comments Pt is a poor historian; reports living at [facility] for the past 3 years  Prior Function   Level of Edgar Needs assistance with IADLs; Needs assistance with ADLs and functional mobility   Lives With Facility staff   Receives Help From Other (Comment)  (facility staff)   ADL Assistance Needs assistance   IADLs Needs assistance   Falls in the last 6 months 1 to 4   Vocational Retired   Comments Pt is a poor historian; reports she is I w/ dressing, has assist from facility staff to get to shower located in hallway 2x/wk  Reports she is I w/ getting to her w/c and only uses w/c @ facility  Can self propel  Reports she no longer walks  Lifestyle   Autonomy Assist ADLS/IADLS, I w/ transfers and use of w/c for functional mobility (per pt report)   Reciprocal Relationships Pt lives w/ facility staff   Service to Others Retired   [address] Enjoys oil painting   Psychosocial   Psychosocial (WDL) X   Patient Behaviors/Mood Irritable;Labile; Wandering   Subjective   Subjective "There big fat asses should be fired, they don't do anything to help me " - in reference to [facility]  ADL   Eating Assistance 5  Supervision/Setup   Grooming Assistance 5  Supervision/Setup   UB Bathing Assistance 4  Minimal Assistance   LB Bathing Assistance 3  Moderate Assistance   UB Dressing Assistance 4  Minimal Assistance   LB Dressing Assistance 3  Moderate [address]  3  Moderate Assistance   Functional Assistance 3  Moderate Assistance   Functional Deficit Steadying;Verbal cueing;Supervision/safety; Increased time to complete   Additional Comments Pt found w/ bed pan sitting at end of bed w/ small amount of urine and copious amount of wet toilet paper inside; unsure if pt used bedpan independently and left at bottom of bed  Pt is emotionally labile and overall a poor historian  Bed Mobility   Supine to Sit 4  Minimal assistance   Additional items Assist x 1; Increased time required;Verbal cues;LE management   Sit to Supine 4  Minimal assistance   Additional items HOB elevated; Increased time required;LE management;Verbal cues   Additional Comments Pt sat EOB w/ Fair sitting balance/trunk control   Transfers   Sit to Stand 4  Minimal assistance   Additional items Assist x 1; Increased time required;Verbal cues   Stand to Sit 4  Minimal assistance   Additional items Assist x 1;Verbal cues; Increased time required   Additional Comments HHA used into standing; Pt appears to be behavioral, initially reports she can not stand and doesn't not stand anymore  Pt initially stood w/ Mod A x2, w/ B/L HHA  Pt then returned to EOB, became agitated and stood a 2nd time on her own to lift buttocks off bed to remove pads underneath her (approx 50% of full stand achieved on her own)  Functional Mobility   Functional Mobility 3  Moderate assistance   Additional Comments Pt took lateral side steps @ EOB w/ Mod Ax1; SBA 2nd for safety  Has decreased understanding of deficits/safety awareness  Additional items Hand hold assistance   Balance   Static Sitting Fair   Dynamic Sitting Fair -   Static Standing Poor +   Dynamic Standing Poor   Ambulatory Poor   Activity Tolerance   Activity Tolerance Patient limited by fatigue;Patient limited by pain;Treatment limited secondary to agitation   Medical Staff Made Aware Casandra LEI   Nurse Made Aware yes, Sonja JARAMILLO Assessment   RUE Assessment WFL   LUE Assessment   LUE Assessment WFL   Hand Function   Gross Motor Coordination Functional   Fine Motor Coordination Functional   Sensation   Light Touch No apparent deficits   Vision - Complex Assessment   Additional Comments Unable to formally assess; L eye is swollen/bruised  Cognition   Overall Cognitive Status (S)  Impaired   Arousal/Participation Responsive; Cooperative   Attention Attends with cues to redirect   Orientation Level Oriented to person;Oriented to place; Disoriented to time;Disoriented to situation   Memory Decreased recall of precautions   Following Commands Follows one step commands with increased time or repetition   Comments Pt is a poor historian; cooperative; very emotionally labile  Threatened to leave several times, presents w/ behavioral tendencies hindering her overall occupational performance   Has decreased understanding of deficits/safety awareness; overall impulsive and needs frequent re-direction to task   Assessment   Limitation Decreased ADL status; Decreased Safe judgement during ADL;Decreased cognition;Decreased endurance;Decreased self-care trans;Decreased high-level ADLs   Prognosis Fair   Assessment Pt is a 77 y/o female seen for OT eval s/p adm to [facility] w/ AMS, and multiple falls over the past week w/ seizure  Pt  has no past medical history on file  Pt with active OT orders and up with assistance  orders  Pt lives with facility staff @ [facility]  Pt reports being I w/ dressing, has assist for bathing 2x/wk and assist for IADLS (meals provided, cleaning/laundry services provided), does not drive, & required use of DME PTA including a w/c; (pt overall is a poor historian; will need to confirm living arrangements and PLOF information)  Pt is currently demonstrating the following occupational deficits: Min A UB ADLS, Mod A LB ADLS, Min A bed mobility, Min A transfers and Mod A functional mobility w/ SBA 2nd for safety  These deficits that are impacting pt's baseline areas of occupation are a result of the following impairments: pain, endurance, activity tolerance, functional mobility, forward functional reach, balance, trunk control, functional standing tolerance, decreased I w/ ADLS/IADLS, cognitive impairments, decreased safety awareness and decreased insight into deficits  The following Occupational Performance Areas to address include: bathing/shower, toilet hygiene, dressing, health maintenance, functional mobility and clothing management  Recommend inpatient rehab  upon D/C (pending confirmation of pt's baseline functional status and amount of help usually provided to pt at [facility]; if pt is close to/at baseline then may be able to D/C back to [facility] w/ prior level of assist)   Pt to continue to benefit from acute immediate OT services to address the following goals 3-5x/week to  w/in 10-14 days:   Goals Patient Goals to leave   LTG Time Frame 10-14   Long Term Goal #1 see below listed goals   Plan   Treatment Interventions ADL retraining;Functional transfer training;UE strengthening/ROM; Endurance training;Cognitive reorientation;Patient/family training;Equipment evaluation/education; Compensatory technique education;Continued evaluation; Energy conservation; Activityengagement   Goal Expiration Date 10/09/22   OT Frequency 3-5x/wk   Recommendation   OT Discharge Recommendation Post acute rehabilitation services   Additional Comments  The patient's raw score on the AM-PAC Daily Activity inpatient short form is 16, standardized score is 35 96, less than 39 4  Patients at this level are likely to benefit from discharge to post-acute rehabilitation services  Please refer to the recommendation of the Occupational Therapist for safe discharge planning   Additional Comments 2 Pt seen as a co-evaluation due to the patient's co-morbidities, clinically unstable presentation, and present impairments which are a regression from the patient's baseline     [facility] Daily Activity Inpatient   Lower Body Dressing 2   Bathing 2   Toileting 2   Upper Body Dressing 3   Grooming 3   Eating 4   Daily Activity Raw Score 16   Daily Activity Standardized Score (Calc for Raw Score >=11) 35 96   AM-PAC Applied Cognition Inpatient   Following a Speech/Presentation 2   Understanding Ordinary Conversation 3   Taking Medications 2   Remembering Where Things Are Placed or Put Away 2   Remembering List of 4-5 Errands 2   Taking Care of Complicated Tasks 1   Applied Cognition Raw Score 12   Applied Cognition Standardized Score 28 82        GOALS    1) Pt will improve activity tolerance to G for 30 min txment sessions for increase engagement in functional tasks  2) Pt will complete UB/LB dressing/self care w/ S using adaptive device and DME as needed  3) Pt will complete bathing w/ S w/ use of AE and DME as needed  4) Pt will complete toileting w/ S w/ G hygiene/thoroughness using DME as needed  5) Pt will improve functional transfers to S on/off all surfaces using DME as needed w/ G balance/safety   6) Pt will improve functional mobility during ADL/IADL/leisure tasks to S using DME as needed w/ G balance/safety   7) Pt will be attentive 100% of the time during ongoing cognitive assessment w/ G participation to assist w/ safe d/c planning/recommendations  8) Pt will demonstrate G carryover of pt/caregiver education and training as appropriate w/o cues w/ good tolerance to increase safety during functional tasks  9) Pt will demonstrate 100% carryover of energy conservation techniques t/o functional I/ADL/leisure tasks w/o cues s/p skilled education to increase endurance during functional tasks    Rayshawn Parekh MS, OTR/L

## 2022-09-26 PROCEDURE — 99233 SBSQ HOSP IP/OBS HIGH 50: CPT | Performed by: INTERNAL MEDICINE

## 2022-09-26 RX ADMIN — LISINOPRIL 5 MG: 5 TABLET ORAL at 08:41

## 2022-09-26 RX ADMIN — Medication 1 TABLET: at 08:41

## 2022-09-26 RX ADMIN — APIXABAN 5 MG: 5 TABLET, FILM COATED ORAL at 17:06

## 2022-09-26 RX ADMIN — PRAVASTATIN SODIUM 20 MG: 20 TABLET ORAL at 17:06

## 2022-09-26 RX ADMIN — LEVOTHYROXINE SODIUM 25 MCG: 25 TABLET ORAL at 07:00

## 2022-09-26 RX ADMIN — APIXABAN 5 MG: 5 TABLET, FILM COATED ORAL at 08:41

## 2022-09-26 RX ADMIN — LEVETIRACETAM 500 MG: 500 TABLET, FILM COATED ORAL at 21:44

## 2022-09-26 RX ADMIN — LEVETIRACETAM 500 MG: 500 TABLET, FILM COATED ORAL at 08:41

## 2022-09-26 RX ADMIN — NYSTATIN: 100000 POWDER TOPICAL at 17:06

## 2022-09-26 RX ADMIN — NYSTATIN 1 APPLICATION: 100000 POWDER TOPICAL at 08:42

## 2022-09-26 NOTE — ASSESSMENT & PLAN NOTE
· Patient with known history of seizure disorder followed by Dr Marshal Mo with [facility] Neurology  · Patient refused seizure medications in the past  · Supposed to be on Keppra 500 mg p o  B i d -but has been refusing  · Neurology is adding  zonisamide 100 mg HS for 1 week and then 200 mg HS for 1 week and 300 mg HS thereafter    Once the zonisamide level is therapeutic can discontinue Keppra  · Evaluated by neuropsych, no  decision making capacity

## 2022-09-26 NOTE — CONSULTS
Consultation - Neuropsychology/Psychology Department  Juan Francisco Ortega 76 y o  female MRN: [id number]  [facility] Encounter: [id number]        Reason for Consultation:  Juan Francisco Ortega is a 76y o  year old female who was referred for a Neuropsychological Exam to assess cognitive functioning and comment on capacity to make informed medical decisions  History of Present Illness  Altered mental status    Physician Requesting Consult: Bailey Naylor DO    PROBLEM LIST:  Patient Active Problem List   Diagnosis    Altered mental status, unspecified    [facility]ure ([facility] )    Renal mass    History of CVA (cerebrovascular accident)    [facility]    Hypertension    Hyperlipidemia    Hypothyroidism    Renal vein thrombosis (HCC)         Historical Information   History reviewed  No pertinent past medical history  History reviewed  No pertinent surgical history  Social History   Social History     Substance and Sexual Activity   Alcohol Use Not Currently     Social History     Substance and Sexual Activity   Drug Use Never     Social History     Tobacco Use   Smoking Status Never Smoker   Smokeless Tobacco Never Used     Family History: History reviewed  No pertinent family history      Meds/Allergies   current meds:   Current Facility-Administered Medications   Medication Dose Route Frequency    acetaminophen (TYLENOL) tablet 975 mg  975 mg Oral Q8H [facility]    albuterol (PROVENTIL HFA,VENTOLIN HFA) inhaler 2 puff  2 puff Inhalation Q6H PRN    apixaban (ELIQUIS) tablet 5 mg  5 mg Oral BID    bisacodyl (DULCOLAX) rectal suppository 10 mg  10 mg Rectal Daily PRN    calcium carbonate (TUMS) chewable tablet 500 mg  500 mg Oral Daily PRN    erythromycin (ILOTYCIN) 0 5 % ophthalmic ointment 0 5 inch  0 5 inch Both Eyes HS    guaiFENesin (ROBITUSSIN) oral solution 200 mg  200 mg Oral TID PRN    levETIRAcetam (KEPPRA) tablet 500 mg  500 mg Oral Q12H [facility]    levothyroxine tablet 25 mcg  25 mcg Oral Daily    lisinopril (ZESTRIL) tablet 5 mg  5 mg Oral Daily    magnesium hydroxide (MILK OF MAGNESIA) oral suspension 30 mL  30 mL Oral Daily PRN    multivitamin-minerals (CENTRUM) tablet 1 tablet  1 tablet Oral Daily    nystatin (MYCOSTATIN) powder   Topical BID    ondansetron (ZOFRAN) injection 4 mg  4 mg Intravenous Q6H PRN    oxyCODONE (ROXICODONE) IR tablet 2 5 mg  2 5 mg Oral Q4H PRN    pravastatin (PRAVACHOL) tablet 20 mg  20 mg Oral Daily With Dinner    senna (SENOKOT) tablet 17 2 mg  2 tablet Oral BID    sodium phosphate-biphosphate (FLEET) enema 1 enema  1 enema Rectal Daily PRN    zonisamide (ZONEGRAN) capsule 100 mg  100 mg Oral HS       No Known Allergies      Family and Social Support:   No data recorded    Behavioral Observations: Alert, UNABLE to accurately state the name of city, day/week and day/month; affect appeared irritable and patient denied depressed mood and anxiety; patient's thoughts were at times tangential and she reported, "there is nothing wrong with me"  Patient reported she is able to return home today and care for herself  Cognitive Examination    General Cognitive Functioning MMSE = Impaired 14/28; Attention/Concentration Auditory Selective Attention = Impaired; Auditory Vigilance = Impaired;     Frontal Systems/Executive Functioning Mental Flexibility/Cognitive Control = Impaired; Working Memory = Impaired Abstract Reasoning = Impaired; Generative Ability = Impaired,     Language Functioning Confrontation naming = Within Normal Limits, Phonemic Fluency = Impaired; Semantic Retrieval = Impaired; Comprehension of Complex Ideational Material = Impaired; Praxis = Within Normal Limits; Repetition = Within Normal Limits; Basic Reading = Within Normal Limits; Following Commands = Impaired    Memory Functioning Narrative Recall - Short Delay = Impaired;  Long Delay Narrative Recall = Impaired;     Visuo-Spatial Abilities Not Assessed    Functional Knowledge Health & Safety Knowledge = Impaired;     Summary/Impression:  Results of Neuropsychological Exam revealed diffuse cognitive dysfunction and on a measure assessing awareness of personal health status and ability to evaluate health problems, handle medical emergencies and take safety precautions, patient performed in the IMPAIRED range of functioning  At this time, patient does not appear to have capacity to make fully informed medical decisions

## 2022-09-26 NOTE — ASSESSMENT & PLAN NOTE
· Patient had a CT abdomen as part of the trauma protocol-CT scan showed 6 cm renal mass   6 cm solid mass at the upper pole of the left kidney worrisome for malignancy  The adjacent upper pole of the left kidney appears abnormal   It is uncertain whether this is due to diminished enhancement possibly from arterial involvement versus tumor infiltration    In addition, there is concern for thrombus or tumor thrombus in the left renal vein extending into the IVC  · Patient refuses urology evaluation  · No decision-making capacity  · Discussed with patient's sister, she is aware of above, okay not to proceed with Urology evaluation

## 2022-09-26 NOTE — ASSESSMENT & PLAN NOTE
· Left-sided weakness  · Patient reported that she uses wheelchair  · PT OT evaluation recommending rehab

## 2022-09-26 NOTE — UTILIZATION REVIEW
Initial Clinical Review    Admission: Date/Time/Statement:   Admission Orders (From admission, onward)     Ordered        09/24/22 1445  INPATIENT ADMISSION  Once                      Orders Placed This Encounter   Procedures    INPATIENT ADMISSION     Standing Status:   Standing     Number of Occurrences:   1     Order Specific Question:   Level of Care     Answer:   Med Surg [16]     Order Specific Question:   Estimated length of stay     Answer:   More than 2 Midnights     Order Specific Question:   Certification     Answer:   I certify that inpatient services are medically necessary for this patient for a duration of greater than two midnights  See H&P and MD Progress Notes for additional information about the patient's course of treatment  ED Arrival Information     Expected   -    Arrival   9/24/2022 12:10    Acuity   Emergent            Means of arrival   Ambulance    Escorted by   [facility]    Service   Hospitalist    Admission type   [address] complaint   Trauma           Chief Complaint   Patient presents with   Jose Antonio Jerez     From [facility]      Initial Presentation: 76 y o  female with PMH of seizure disorder, Afib, pacemaker, CVA, TIA, HTN, hypothyroid presents with c/o acute change in mental status  Patient is a resident of [facility] and is not able to provide reliable history  Patient gets very agitated with questioning and she is recurs in to be discharged back to the facility but was eventually able to convince her to stay in the hospital   It appears that patient had an fall 2 days ago  Patient with ecchymosis and hematoma of left side of the face and periorbital ecchymosis  It appears that the patient refuses to go to the hospital at that time  Since then facility noted that the patient is with altered mental status and was brought to the hospital   Patient was evaluated by trauma and trauma workup was essentially negative    Patient was also evaluated by Neurology  Patient with known history of seizure disorder but not compliant with the medications  Discussed with the patient about the abnormality finding on the CT scan-patient reported that she wants to wait to be evaluated by Urology  Admit Inpatient med surg d/t altered mental status, renal mass, seizure:  Neurology urology consult, MRI head, check TSH, monitor BP, PT/OT eval   Continue home meds  Obtain capacity eval     9/24 Neurology Consult:  Patient sustained an unwitnessed fall with + head strike and "seizure like activity" lasting approx  5 minutes with teeth/hand clenching x 1 week ago  She was taken to [facility], however she refused treatment signed out AMA  She had a second fall x 2 days ago, no reported seizure like activity but refused to seek medical treatment at that time  Concern that patient is having breakthrough seizures secondary to medication noncompliance, espeically since she's had 2 falls within the last week       Add zonisamide 100 mg qhs  for 1 week and then 200 mg HS for 1 week and 300 mg HS thereafter  Continue Keppra for now, Eliquis  No need for EEG at this time  Check for other metabolic derangements that could be causing altered mentation  Recommend heme/onc consult for possible malignancy seen on CT C/A/P  PT/OT eval     Date: 9/25   Day 2:   Pt c/o pain all over, HA and dizziness  Continue zonisamide and keppra  MRI brain ordered but pt has a pacemaker  Neurology following  Await capacity eval   Continue eliquis, changing to bid  Continue home meds  PT/OT recommend post acute rehab      ED Triage Vitals   Temperature Pulse Respirations Blood Pressure SpO2   09/24/22 1209 09/24/22 1209 09/24/22 1209 09/24/22 1209 09/24/22 1209   98 6 °F (37 °C) 71 20 133/66 98 %      Temp Source Heart Rate Source Patient Position - Orthostatic VS BP Location FiO2 (%)   09/24/22 1209 09/24/22 1209 -- -- --   Tympanic Monitor         Pain Score       09/24/22 1729       No Pain          Wt Readings from Last 1 Encounters:   09/24/22 72 kg (158 lb 11 7 oz)     Additional Vital Signs:   Date/Time Temp Pulse Resp BP MAP (mmHg) SpO2 O2 Device   09/26/22 08:41:28 -- 71 -- 158/79 105 97 % --   09/25/22 2051 -- -- -- -- -- -- None (Room air)   09/25/22 20:41:57 -- 74 -- 159/79 106 97 % --   09/25/22 20:41:50 -- 72 -- 159/79 106 97 % --   09/25/22 14:37:29 97 7 °F (36 5 °C) 70 16 154/80 105 97 % --   09/25/22 0715 -- -- -- -- -- -- None (Room air)   09/25/22 06:46:51 97 8 °F (36 6 °C) 60 15 157/65 96 97 % --   09/24/22 22:45:21 -- 70 16 149/67 94 96 % --   09/24/22 2048 -- -- -- -- -- -- None (Room air)   09/24/22 17:21:44 97 7 °F (36 5 °C) 76 16 176/90 Abnormal  119 97 % --   09/24/22 1430 -- 76 20 190/95 Abnormal  -- 98 % --   09/24/22 1400 -- 74 16 192/94 Abnormal  -- 97 % --   09/24/22 1330 -- 60 18 192/99 Abnormal  -- 98 % None (Room air)   09/24/22 1315 -- 68 20 173/77 Abnormal  -- 98 % --   09/24/22 1300 -- 70 22 162/76 -- 98 % --   09/24/22 1245 -- 72 16 172/83 Abnormal  -- 96 % None (Room air)   09/24/22 1240 -- 74 18 180/74 Abnormal  -- 98 % None (Room air)   09/24/22 1227 -- 75 16 179/93 Abnormal  -- 98 % None (Room air)   09/24/22 12:18:54 -- 69 18 191/91 Abnormal  -- 97 % None (Room air)   09/24/22 12:09:19 98 6 °F (37 °C) 71 20 133/66 -- 98 % None (Room air)       Pertinent Labs/Diagnostic Test Results:   CTA head and neck w wo contrast   Final Result by Manuel Garcia MD (09/24 8214)      Left frontal scalp hematoma without intracranial hemorrhage or calvarial fracture  Chronic microangiopathy changes  No cervical or intracranial large vessel occlusion, aneurysm or dissection injury  Moderate right ICA origin stenosis due to atherosclerotic disease        Left occipital sebaceous cyst             I personally discussed this study with Loki Hartman on 9/24/2022 at 1:46 PM   Please note that there was difficulty to reach the trauma service immediately after interpretation as they were unavailable due to patient care duties  Workstation performed: [id number]         CT recon only cervical spine (No Charge)   Final Result by Amy Johnson MD (09/24 0782)      No cervical spine fracture or traumatic malalignment  Cervical spondylosis  I personally discussed this study with Dyan Jerez on 9/24/2022 at 1:48 PM   Please note that the trauma service was difficult to reach immediately after the interpretation of this study as they were involved in patient care duties  Workstation performed: [id number]         CT recon (no charge)   Final Result by Chad Guerra MD (09/24 1948)      No evidence of acute traumatic injury to the facial bones  I personally discussed this study with Dyan Jerez on 9/24/2022 at 1:55 PM                   Workstation performed: [id number]         CT chest abdomen pelvis w contrast   Final Result by Cahd Guerra MD (09/24 2581)      1   6 cm solid mass at the upper pole of the left kidney worrisome for malignancy  The adjacent upper pole of the left kidney appears abnormal   It is uncertain whether this is due to diminished enhancement possibly from arterial involvement versus    tumor infiltration  In addition, there is concern for thrombus or tumor thrombus in the left renal vein extending into the IVC  2   3 cm infrarenal abdominal aortic aneurysm  3   Cardiomegaly  I personally discussed this study with Dyan Jerez on 9/24/2022 at 1:32 PM                         Workstation performed: [id number]         XR Trauma multiple (SLB/SLRA trauma bay ONLY)   Final Result by Helen Calhoun MD (09/24 4714)      No acute cardiopulmonary disease within limitations of supine imaging                 Workstation performed: [id number]         XR chest 1 view    (Results Pending)   MRI inpatient order    (Results Pending)         Results from last 7 days   Lab Units 09/24/22  1229 09/24/22  1223   WBC Thousand/uL  --  8 52   HEMOGLOBIN g/dL  --  12 7   I STAT HEMOGLOBIN g/dl 12 9  --    HEMATOCRIT %  --  40 9   HEMATOCRIT, ISTAT % 38  --    PLATELETS Thousands/uL  --  244   NEUTROS ABS Thousands/µL  --  5 18         Results from last 7 days   Lab Units 09/24/22  1229 09/24/22  1223   SODIUM mmol/L  --  141   POTASSIUM mmol/L  --  4 4   CHLORIDE mmol/L  --  113*   CO2 mmol/L  --  23   CO2, I-STAT mmol/L 22  --    ANION GAP mmol/L  --  5   BUN mg/dL  --  24   CREATININE mg/dL  --  1 14   EGFR ml/min/1 73sq m  --  45   CALCIUM mg/dL  --  9 1   CALCIUM, IONIZED, ISTAT mmol/L 1 21  --      Results from last 7 days   Lab Units 09/24/22  1223   GLUCOSE RANDOM mg/dL 89     Results from last 7 days   Lab Units 09/24/22  1229   PH, JOSE F I-STAT  7 449*   PCO2, JOSE F ISTAT mm HG 30 8*   PO2, JOSE F ISTAT mm HG 46 0*   HCO3, JOSE F ISTAT mmol/L 21 3*   I STAT BASE EXC mmol/L -2   I STAT O2 SAT % 84     Results from last 7 days   Lab Units 09/24/22  1750 09/24/22  1223   HS TNI 0HR ng/L  --  13   HS TNI 2HR ng/L 15  --    HSTNI D2 ng/L 2  --    HS TNI 4HR ng/L 13  --    HSTNI D4 ng/L 0  --        ED Treatment:   Medication Administration from 09/24/2022 1153 to 09/24/2022 1719       Date/Time Order Dose Route Action     09/24/2022 1253 iohexol (OMNIPAQUE) 350 MG/ML injection (SINGLE-DOSE) 100 mL 100 mL Intravenous Given        History reviewed  No pertinent past medical history    Present on Admission:  **None**      Admitting Diagnosis: Seizure ([facility] [facility] ) [R56 9]  Kidney mass [N28 89]  CVA (cerebral vascular accident) ([facility] [facility] ) [I63 9]  AMS (altered mental status) [R41 82]  Age/Sex: 76 y o  female  Admission Orders:  Scheduled Medications:  acetaminophen, 975 mg, Oral, Q8H CYNDY  apixaban, 5 mg, Oral, BID  erythromycin, 0 5 inch, Both Eyes, HS  levETIRAcetam, 500 mg, Oral, Q12H [address]  levothyroxine, 25 mcg, Oral, Daily  lisinopril, 5 mg, Oral, Daily  multivitamin-minerals, 1 tablet, Oral, Daily  nystatin, , Topical, BID  pravastatin, 20 mg, Oral, Daily With Dinner  senna, 2 tablet, Oral, BID  zonisamide, 100 mg, Oral, HS      Continuous IV Infusions:     PRN Meds:  albuterol, 2 puff, Inhalation, Q6H PRN  bisacodyl, 10 mg, Rectal, Daily PRN  calcium carbonate, 500 mg, Oral, Daily PRN  guaiFENesin, 200 mg, Oral, TID PRN  magnesium hydroxide, 30 mL, Oral, Daily PRN  ondansetron, 4 mg, Intravenous, Q6H PRN  oxyCODONE, 2 5 mg, Oral, Q4H PRN  sodium phosphate-biphosphate, 1 enema, Rectal, Daily PRN    scd    IP CONSULT TO NEUROLOGY  IP CONSULT TO CASE MANAGEMENT  IP CONSULT TO NEUROPSYCHOLOGY    Network Utilization Review Department  ATTENTION: Please call with any questions or concerns to [phone] and carefully listen to the prompts so that you are directed to the right person  All voicemails are confidential   Alondra Briceno all requests for admission clinical reviews, approved or denied determinations and any other requests to dedicated fax number below belonging to the campus where the patient is receiving treatment   List of dedicated fax numbers for the Facilities:  [address] [place] [address] DENIALS (Administrative/Medical Necessity) [phone]   [address] (Maternity/NICU/Pediatrics) [phone]   [address]  [address] [facility] [place] [address] [address] [place] [address] [address] [place] [address] [phone]

## 2022-09-26 NOTE — ASSESSMENT & PLAN NOTE
· Patient was transferred to the hospital from [facility] due to altered mental status after a fall 2 days  ago  · Patient with history of seizure disorder  · She was evaluated by trauma    Had CTA head and neck-unremarkable  · Neurology evaluation appreciated  · Patient had a seizure-like activity lasting approximately 5 minutes  and patient was taken to [facility] and she refused to stay and signed AMA  · Evaluated by Neurology with concern of having breakthrough seizure secondary to medication noncompliance  · Started empirically on zonisamide, continue Keppra  · Brain MRI was ordered by Neurology however patient has a pacemaker  · No signs of infection  · Neurology is following

## 2022-09-26 NOTE — PLAN OF CARE
Problem: SAFETY ADULT  Goal: Maintain or return to baseline ADL function  Description: INTERVENTIONS:  -  Assess patient's ability to carry out ADLs; assess patient's baseline for ADL function and identify physical deficits which impact ability to perform ADLs (bathing, care of mouth/teeth, toileting, grooming, dressing, etc )  - Assess/evaluate cause of self-care deficits   - Assess range of motion  - Assess patient's mobility; develop plan if impaired  - Assess patient's need for assistive devices and provide as appropriate  - Encourage maximum independence but intervene and supervise when necessary  - Involve family in performance of ADLs  - Assess for home care needs following discharge   - Consider OT consult to assist with ADL evaluation and planning for discharge  - Provide patient education as appropriate  Outcome: Progressing     Problem: PAIN - ADULT  Goal: Verbalizes/displays adequate comfort level or baseline comfort level  Description: Interventions:  - Encourage patient to monitor pain and request assistance  - Assess pain using appropriate pain scale  - Administer analgesics based on type and severity of pain and evaluate response  - Implement non-pharmacological measures as appropriate and evaluate response  - Consider cultural and social influences on pain and pain management  - Notify physician/advanced practitioner if interventions unsuccessful or patient reports new pain  Outcome: Progressing

## 2022-09-26 NOTE — UTILIZATION REVIEW
Inpatient Admission Authorization Request   NOTIFICATION OF INPATIENT ADMISSION/INPATIENT AUTHORIZATION REQUEST   SERVICING FACILITY:   [facility]  Address: [address] [place] [address], [address] [place] [address]  Tax ID: [id number]  NPI: [id number]  Place of [facility]: [facility]patient [address] [facility]  [address]  Place of Service Code: 24     ATTENDING PROVIDER:  Attending Name and NPI#: Camilo Collet [[id number]]  Address: [address] [place] [address], [address] [place] [address]  Phone: [phone]     UTILIZATION REVIEW CONTACT:  Iman Smith Utilization   Network Utilization Review Department  Phone: [phone]  Fax: [phone]  Email: [email or web address]     PHYSICIAN ADVISORY SERVICES:  FOR [id number] REVIEW - MEDICAL NECESSITY DENIAL  Phone: [phone]  Fax: [phone]  Email: [email or web address]     TYPE OF REQUEST:  Inpatient Status     ADMISSION INFORMATION:  ADMISSION DATE/TIME: 9/24/22  2:45 PM  PATIENT DIAGNOSIS CODE/DESCRIPTION:  Seizure ([address] ) [R56 9]  Kidney mass [N28 89]  CVA (cerebral vascular accident) ([address] ) [I63 9]  AMS (altered mental status) [R41 82]  DISCHARGE DATE/TIME: No discharge date for patient encounter  IMPORTANT INFORMATION:  Please contact Iman Smith directly with any questions or concerns regarding this request  Department voicemails are confidential     Send requests for admission clinical reviews, concurrent reviews, approvals, and administrative denials due to lack of clinical to fax [phone]

## 2022-09-26 NOTE — RESTORATIVE TECHNICIAN NOTE
Restorative Technician Note      Patient Name: Tomas Claire     Note Type: Mobility (Pt refused sitting EOB/getting OOB to the chair, nursing aware )  Patient Position Upon Consult: Supine  Activity Performed: Repositioned  Education Provided: Yes  Patient Position at End of Consult: Supine;  All needs within reach; Bed/Chair alarm activated    Chika ROACH, Restorative Technician, [employer]

## 2022-09-26 NOTE — ASSESSMENT & PLAN NOTE
· Monitor blood pressure  · Do not appear to be any antihypertensives as outpatient  · Started on lisinopril

## 2022-09-26 NOTE — PROGRESS NOTES
[address] [facility]  Progress Note - Yifan Slot 1945, 76 y o  female MRN: [id number]  Unit/Bed#: [facility] 706-01 Encounter: [id number]  Primary Care Provider: Diana Sarah MD   Date and time admitted to hospital: 9/24/2022 12:10 PM    * Altered mental status, unspecified  Assessment & Plan  · Patient was transferred to the hospital from [facility] due to altered mental status after a fall 2 days  ago  · Patient with history of seizure disorder  · She was evaluated by trauma  Had CTA head and neck-unremarkable  · Neurology evaluation appreciated  · Patient had a seizure-like activity lasting approximately 5 minutes  and patient was taken to [facility] and she refused to stay and signed AMA  · Evaluated by Neurology with concern of having breakthrough seizure secondary to medication noncompliance  · Started empirically on zonisamide, continue Keppra  · Brain MRI was ordered by Neurology however patient has a pacemaker  · No signs of infection  · Neurology is following    Seizure [facility])  Assessment & Plan  · Patient with known history of seizure disorder followed by Dr Hunter Hinton with [facility]  · Patient refused seizure medications in the past  · Supposed to be on Keppra 500 mg p o  B i d -but has been refusing  · Neurology is adding  zonisamide 100 mg HS for 1 week and then 200 mg HS for 1 week and 300 mg HS thereafter  Once the zonisamide level is therapeutic can discontinue Keppra  · Evaluated by neuropsych, no  decision making capacity    Renal vein thrombosis (HCC)  Assessment & Plan  CT scan finding, there is concern for thrombus or tumor thrombus in the left renal vein extending into the IVC  Suspect secondary to underlying renal mass  Eliquis was changed to b i d      Renal mass  Assessment & Plan  · Patient had a CT abdomen as part of the trauma protocol-CT scan showed 6 cm renal mass   6 cm solid mass at the upper pole of the left kidney worrisome for malignancy  The adjacent upper pole of the left kidney appears abnormal   It is uncertain whether this is due to diminished enhancement possibly from arterial involvement versus tumor infiltration  In addition, there is concern for thrombus or tumor thrombus in the left renal vein extending into the IVC  · Patient refuses urology evaluation  · No decision-making capacity  · Discussed with patient's sister, she is aware of above, okay not to proceed with Urology evaluation    Hypothyroidism  Assessment & Plan  · Continue Synthroid  · Check TSH however patient refused blood work    Hyperlipidemia  Assessment & Plan  Continue statin    Hypertension  Assessment & Plan  · Monitor blood pressure  · Do not appear to be any antihypertensives as outpatient  · Started on lisinopril    Atrial fibrillation (HCC)  Assessment & Plan  · Paroxysmal atrial fibrillation  Anticoagulated with Eliquis  By reviewing the nursing home records patient is only getting Eliquis once a day  · Patient has pacemaker    History of CVA (cerebrovascular accident)  Assessment & Plan  · Left-sided weakness  · Patient reported that she uses wheelchair  · PT OT evaluation recommending rehab      VTE Pharmacologic Prophylaxis:   High Risk (Score >/= 5) - Pharmacological DVT Prophylaxis Ordered: apixaban (Eliquis)  Sequential Compression Devices Ordered  Patient Centered Rounds: I performed bedside rounds with nursing staff today  Discussions with Specialists or Other Care Team Provider:     Education and Discussions with Family / Patient: Updated  (sister) via phone  Time Spent for Care: 60 minutes  More than 50% of total time spent on counseling and coordination of care as described above      Current Length of Stay: 2 day(s)  Current Patient Status: Inpatient   Certification Statement: The patient will continue to require additional inpatient hospital stay due to Management of change in mental status and seizure  Discharge Plan: Anticipate discharge in 24-48 hrs to rehab facility  Code Status: Level 3 - DNAR and DNI    Subjective:   Patient seen and examined  Comfortable in bed  Wants to go back to her living facility  No event overnight    Objective:     Vitals:   Temp (24hrs), Av 7 °F (36 5 °C), Min:97 7 °F (36 5 °C), Max:97 7 °F (36 5 °C)    Temp:  [97 7 °F (36 5 °C)] 97 7 °F (36 5 °C)  HR:  [70-74] 71  Resp:  [16] 16  BP: (154-159)/(79-80) 158/79  SpO2:  [97 %] 97 %  There is no height or weight on file to calculate BMI  Input and Output Summary (last 24 hours): Intake/Output Summary (Last 24 hours) at 2022 1232  Last data filed at 2022 1301  Gross per 24 hour   Intake --   Output 200 ml   Net -200 ml       Physical Exam:   Physical Exam   Patient is awake alert in no acute distress  Left Periorbital/facial ecchymosis  Comfortable in bed  Lung clear to auscultation bilateral  Heart positive S1 S2 no murmur  Abdomen soft nontender  Lower extremities no edema    Additional Data:     Labs:  Results from last 7 days   Lab Units 22  1229 22  1223   WBC Thousand/uL  --  8 52   HEMOGLOBIN g/dL  --  12 7   I STAT HEMOGLOBIN g/dl 12 9  --    HEMATOCRIT %  --  40 9   HEMATOCRIT, ISTAT % 38  --    PLATELETS Thousands/uL  --  244   NEUTROS PCT %  --  61   LYMPHS PCT %  --  28   MONOS PCT %  --  9   EOS PCT %  --  2     Results from last 7 days   Lab Units 22  1229 22  1223   SODIUM mmol/L  --  141   POTASSIUM mmol/L  --  4 4   CHLORIDE mmol/L  --  113*   CO2 mmol/L  --  23   CO2, I-STAT mmol/L 22  --    BUN mg/dL  --  24   CREATININE mg/dL  --  1 14   ANION GAP mmol/L  --  5   CALCIUM mg/dL  --  9 1   GLUCOSE RANDOM mg/dL  --  89                       Lines/Drains:  Invasive Devices  Report    Peripheral Intravenous Line  Duration           Peripheral IV 22 Right Antecubital 1 day                      Imaging: No pertinent imaging reviewed      Recent Cultures (last 7 days): Last 24 Hours Medication List:   Current Facility-Administered Medications   Medication Dose Route Frequency Provider Last Rate    acetaminophen  975 mg Oral [facility] Aure Francis DO      albuterol  2 puff Inhalation Q6H PRN Estephania Claire MD      apixaban  5 mg Oral BID Aure Francis,       bisacodyl  10 mg Rectal Daily PRN Estephania Claire, MD      calcium carbonate  500 mg Oral Daily PRN Estephania Claire MD      erythromycin  0 5 inch Both Eyes HS Estephania Claire MD      guaiFENesin  200 mg Oral TID PRN Estephania Claire, MD      levETIRAcetam  500 mg Oral Q12H Franklin Lazar MD      levothyroxine  25 mcg Oral Daily Estephania Claire, MD      lisinopril  5 mg Oral Daily Aure Francis,       magnesium hydroxide  30 mL Oral Daily PRN Estephania Claire, MD      multivitamin-minerals  1 tablet Oral Daily Estephania Claire MD      nystatin   Topical BID Aure Francis,       ondansetron  4 mg Intravenous Q6H PRN Estephania Claire, MD      oxyCODONE  2 5 mg Oral Q4H PRN Aure Francis,       pravastatin  20 mg Oral Daily With Epifanio Temple MD      senna  2 tablet Oral BID Estephania Claire MD      sodium phosphate-biphosphate  1 enema Rectal Daily PRN Estephania Claire MD      zonisamide  100 mg Oral HS Estephania Claire MD          Today, Patient Was Seen By: Aure Francis DO    **Please Note: This note may have been constructed using a voice recognition system  **

## 2022-09-26 NOTE — ASSESSMENT & PLAN NOTE
CT scan finding, there is concern for thrombus or tumor thrombus in the left renal vein extending into the IVC  Suspect secondary to underlying renal mass  Eliquis was changed to b i d

## 2022-09-26 NOTE — ASSESSMENT & PLAN NOTE
· Paroxysmal atrial fibrillation  Anticoagulated with Eliquis    By reviewing the nursing home records patient is only getting Eliquis once a day  · Patient has pacemaker

## 2022-09-27 VITALS
WEIGHT: 158.73 LBS | OXYGEN SATURATION: 98 % | DIASTOLIC BLOOD PRESSURE: 71 MMHG | OXYGEN SATURATION: 98 % | TEMPERATURE: 98.4 F | RESPIRATION RATE: 16 BRPM | DIASTOLIC BLOOD PRESSURE: 71 MMHG | HEART RATE: 74 BPM | SYSTOLIC BLOOD PRESSURE: 145 MMHG | RESPIRATION RATE: 16 BRPM | TEMPERATURE: 98.4 F | WEIGHT: 158.73 LBS | HEART RATE: 74 BPM | SYSTOLIC BLOOD PRESSURE: 145 MMHG

## 2022-09-27 PROCEDURE — 97110 THERAPEUTIC EXERCISES: CPT

## 2022-09-27 PROCEDURE — 99239 HOSP IP/OBS DSCHRG MGMT >30: CPT | Performed by: INTERNAL MEDICINE

## 2022-09-27 PROCEDURE — 97530 THERAPEUTIC ACTIVITIES: CPT

## 2022-09-27 PROCEDURE — 97116 GAIT TRAINING THERAPY: CPT

## 2022-09-27 RX ORDER — NYSTATIN 100000 [USP'U]/G
POWDER TOPICAL 2 TIMES DAILY
Qty: 15 G | Refills: 0
Start: 2022-09-27

## 2022-09-27 RX ORDER — ZONISAMIDE 100 MG/1
CAPSULE ORAL
Qty: 63 CAPSULE | Refills: 0
Start: 2022-09-27 | End: 2022-10-25

## 2022-09-27 RX ORDER — LISINOPRIL 5 MG/1
5 TABLET ORAL DAILY
Refills: 0
Start: 2022-09-28

## 2022-09-27 RX ORDER — ERYTHROMYCIN 5 MG/G
0.5 OINTMENT OPHTHALMIC
Qty: 3.5 G | Refills: 0
Start: 2022-09-27 | End: 2022-09-30

## 2022-09-27 RX ADMIN — Medication 1 TABLET: at 09:46

## 2022-09-27 RX ADMIN — NYSTATIN 1 APPLICATION: 100000 POWDER TOPICAL at 09:48

## 2022-09-27 RX ADMIN — APIXABAN 5 MG: 5 TABLET, FILM COATED ORAL at 17:37

## 2022-09-27 RX ADMIN — SENNOSIDES 17.2 MG: 8.6 TABLET, FILM COATED ORAL at 17:37

## 2022-09-27 RX ADMIN — PRAVASTATIN SODIUM 20 MG: 20 TABLET ORAL at 17:37

## 2022-09-27 RX ADMIN — APIXABAN 5 MG: 5 TABLET, FILM COATED ORAL at 09:46

## 2022-09-27 RX ADMIN — LISINOPRIL 5 MG: 5 TABLET ORAL at 09:47

## 2022-09-27 RX ADMIN — LEVOTHYROXINE SODIUM 25 MCG: 25 TABLET ORAL at 07:07

## 2022-09-27 RX ADMIN — LEVETIRACETAM 500 MG: 500 TABLET, FILM COATED ORAL at 09:46

## 2022-09-27 NOTE — QUICK NOTE
Patient cannot get MRI brain w/wo contrast due to pacemaker  The Neurology team was recommending CT head with contrast, but patient has been refusing blood work, so cannot check renal function    Instead, will obtain repeat CT head wo contrast

## 2022-09-27 NOTE — PLAN OF CARE
Problem: PAIN - ADULT  Goal: Verbalizes/displays adequate comfort level or baseline comfort level  Description: Interventions:  - Encourage patient to monitor pain and request assistance  - Assess pain using appropriate pain scale  - Administer analgesics based on type and severity of pain and evaluate response  - Implement non-pharmacological measures as appropriate and evaluate response  - Consider cultural and social influences on pain and pain management  - Notify physician/advanced practitioner if interventions unsuccessful or patient reports new pain  Outcome: Progressing     Problem: SAFETY ADULT  Goal: Patient will remain free of falls  Description: INTERVENTIONS:  - Educate patient/family on patient safety including physical limitations  - Instruct patient to call for assistance with activity   - Consult OT/PT to assist with strengthening/mobility   - Keep Call bell within reach  - Keep bed low and locked with side rails adjusted as appropriate  - Keep care items and personal belongings within reach  - Initiate and maintain comfort rounds  - Make Fall Risk Sign visible to staff  - Apply yellow socks and bracelet for high fall risk patients  - Consider moving patient to room near nurses station  Outcome: Progressing     Problem: DISCHARGE PLANNING  Goal: Discharge to home or other facility with appropriate resources  Description: INTERVENTIONS:  - Identify barriers to discharge w/patient and caregiver  - Arrange for needed discharge resources and transportation as appropriate  - Identify discharge learning needs (meds, wound care, etc )  - Arrange for interpretive services to assist at discharge as needed  - Refer to Case Management Department for coordinating discharge planning if the patient needs post-hospital services based on physician/advanced practitioner order or complex needs related to functional status, cognitive ability, or social support system  Outcome: Progressing     Problem: NEUROSENSORY - ADULT  Goal: Achieves stable or improved neurological status  Description: INTERVENTIONS  - Monitor and report changes in neurological status  - Monitor vital signs such as temperature, blood pressure, glucose, and any other labs ordered   - Initiate measures to prevent increased intracranial pressure  - Monitor for seizure activity and implement precautions if appropriate      Outcome: Progressing     Problem: Potential for Falls  Goal: Patient will remain free of falls  Description: INTERVENTIONS:  - Educate patient/family on patient safety including physical limitations  - Instruct patient to call for assistance with activity   - Consult OT/PT to assist with strengthening/mobility   - Keep Call bell within reach  - Keep bed low and locked with side rails adjusted as appropriate  - Keep care items and personal belongings within reach  - Initiate and maintain comfort rounds  - Make Fall Risk Sign visible to staff  - Offer Toileting every 2 Hours, in advance of need  - Initiate/Maintain bed alarm  - Apply yellow socks and bracelet for high fall risk patients  - Consider moving patient to room near nurses station  Outcome: Progressing     Problem: Prexisting or High Potential for Compromised Skin Integrity  Goal: Skin integrity is maintained or improved  Description: INTERVENTIONS:  - Identify patients at risk for skin breakdown  - Assess and monitor skin integrity  - Assess and monitor nutrition and hydration status  - Monitor labs   - Assess for incontinence   - Turn and reposition patient  - Assist with mobility/ambulation  - Relieve pressure over bony prominences  - Avoid friction and shearing  - Provide appropriate hygiene as needed including keeping skin clean and dry  - Evaluate need for skin moisturizer/barrier cream  - Collaborate with interdisciplinary team   - Patient/family teaching  - Consider wound care consult   Outcome: Progressing

## 2022-09-27 NOTE — PHYSICAL THERAPY NOTE
Physical Therapy Treatment Note    Patient's Name: Gordon Zhang  :      22 1040   PT Last Visit   PT Visit Date 22   Note Type   Note Type Treatment   Pain Assessment   Pain Assessment Tool FLACC   Pain Location/Orientation Location: Back   Hospital Pain Intervention(s) Emotional support;Repositioned   Pain Rating: FLACC (Rest) - Face 1   Pain Rating: FLACC (Rest) - Legs 0   Pain Rating: FLACC (Rest) - Activity 0   Pain Rating: FLACC (Rest) - Cry 1   Pain Rating: FLACC (Rest) - Consolability 1   Score: FLACC (Rest) 3   Pain Rating: FLACC (Activity) - Face 0   Pain Rating: FLACC (Activity) - Legs 0   Pain Rating: FLACC (Activity) - Activity 0   Pain Rating: FLACC (Activity) - Cry 0   Pain Rating: FLACC (Activity) - Consolability 0   Score: FLACC (Activity) 0   Restrictions/Precautions   Weight Bearing Precautions Per Order No   Other Precautions Cognitive; Bed Alarm; Chair Alarm; Fall Risk;Pain   General   Chart Reviewed Yes   Response to Previous Treatment Patient with no complaints from previous session  Family/Caregiver Present No   Subjective   Subjective Pt expressing multiple frustrations (that she does not have underwear, that she does not have her W/C here, that she is told to use the bed pan instead of going to the bathroom)  Pt emotionally labile + crying intermittently throughout session  Bed Mobility   Supine to Sit 4  Minimal assistance   Additional items Assist x 1; Increased time required;Verbal cues;LE management   Sit to Supine Unable to assess   Additional Comments Pt greeted in supine  Transfers   Sit to Stand 4  Minimal assistance   Additional items Assist x 1; Increased time required;Verbal cues;Armrests   Stand to Sit 4  Minimal assistance   Additional items Assist x 1; Increased time required;Verbal cues;Armrests   Toilet transfer 4  Minimal assistance   Additional items Assist x 1; Increased time required;Standard toilet;Verbal cues  (grab bar)   Additional Comments HHA   Ambulation/Elevation   Gait pattern Excessively slow; Short stride;Decreased foot clearance; Improper Weight shift   Gait Assistance 4  Minimal assist   Additional items Assist x 1;Verbal cues   Assistive Device   (HHA)   Distance 3' + 3' + 3''   Balance   Static Sitting Fair +   Dynamic Sitting Fair   Static Standing Fair -   Dynamic Standing Poor +   Ambulatory Poor +   Endurance Deficit   Endurance Deficit Yes   Activity Tolerance   Activity Tolerance Patient limited by fatigue   Medical Staff Made Aware nsg   Nurse Made Aware yes - cleared for therapy   Exercises   Hip Abduction Sitting;5 reps;AROM; Bilateral   Hip Adduction Sitting;5 reps;AROM; Bilateral   Knee AROM Long Arc Quad Sitting;5 reps;AROM; Bilateral   Marching Sitting;5 reps;AROM; Bilateral   Assessment   Prognosis Good   Problem List Decreased strength;Decreased endurance; Impaired balance;Decreased mobility; Decreased cognition;Pain   Assessment Pt seen for PT session w/ focus on bed mobility training, t/f training, gait training, + ther ex  Pt w/ multiple complaints, requires frequent redirection to task  Pt did demonstrate progress w/ decreased assistance required for ambulation  Pt stated that she would like to use a W/C to be more mobile - will plan to incorporate this into next session  Goals   Patient Goals go back to [facility]   PT Treatment Day 1   Plan   Treatment/Interventions Functional transfer training;LE strengthening/ROM; Therapeutic exercise; Endurance training;Patient/family training;Equipment eval/education; Bed mobility;Gait training; Compensatory technique education;Spoke to nursing  (balance training)   Progress Progressing toward goals   PT Frequency 3-5x/wk   Recommendation   PT Discharge Recommendation Post acute rehabilitation services   AM-PAC Basic Mobility Inpatient   Turning in Bed Without Bedrails 3   Lying on Back to Sitting on Edge of Flat Bed 3   Moving Bed to Chair 3   Standing Up From Chair 3   Walk in Room 2   Climb 3-5 Stairs 1   Basic Mobility Inpatient Raw Score 15   Basic Mobility Standardized Score 36 97   Highest Level Of Mobility   -HLM Goal 4: Move to chair/commode   -HLM Achieved 4: Move to chair/commode   Education   Education Provided Mobility training;Home exercise program;Assistive device   Patient Demonstrates acceptance/verbal understanding;Reinforcement needed   End of Consult   Patient Position at End of Consult Bedside chair;Bed/Chair alarm activated; All needs within reach  (on waffle cushion)     Jonathan Jackson, PT, DPT

## 2022-09-27 NOTE — PLAN OF CARE
Problem: PHYSICAL THERAPY ADULT  Goal: Performs mobility at highest level of function for planned discharge setting  See evaluation for individualized goals  Description: Treatment/Interventions: Functional transfer training, LE strengthening/ROM, Therapeutic exercise, Endurance training, Bed mobility, Gait training, Equipment eval/education, OT          See flowsheet documentation for full assessment, interventions and recommendations  Outcome: Progressing  Note: Prognosis: Good  Problem List: Decreased strength, Decreased endurance, Impaired balance, Decreased mobility, Decreased cognition, Pain  Assessment: Pt seen for PT session w/ focus on bed mobility training, t/f training, gait training, + ther ex  Pt w/ multiple complaints, requires frequent redirection to task  Pt did demonstrate progress w/ decreased assistance required for ambulation  Pt stated that she would like to use a W/C to be more mobile - will plan to incorporate this into next session  Barriers to Discharge: Decreased caregiver support     PT Discharge Recommendation: Post acute rehabilitation services    See flowsheet documentation for full assessment

## 2022-09-27 NOTE — CASE MANAGEMENT
Case Management Discharge Planning Note    Patient name Arben Tong  [facility] [id number]  : 1945 Date 2022       Current Admission Date: 2022  Current Admission Diagnosis:Altered mental status, unspecified   Patient Active Problem List    Diagnosis Date Noted    Renal vein thrombosis ([facility] ) 2022    Altered mental status, unspecified 2022    Seizure ([facility] ) 2022    Renal mass 2022    History of CVA (cerebrovascular accident) 2022    Atrial fibrillation ([facility] ) 2022    Hypertension 2022    Hyperlipidemia 2022    Hypothyroidism 2022      LOS (days): 3  Geometric Mean LOS (GMLOS) (days): 2 60  Days to GMLOS:-0 5     OBJECTIVE:  Risk of Unplanned Readmission Score: 8         Current admission status: Inpatient   Preferred Pharmacy: No Pharmacies Listed  Primary Care Provider: Sakina Harry MD    Primary Insurance: [facility]  Secondary Insurance: [facility]    DISCHARGE DETAILS:    Discharge planning discussed with[de-identified] CM spoke with the pt and her sister  Freedom of Choice: Yes  Comments - Freedom of Choice: FOC explored and the pt will return to [address] contacted family/caregiver?: Yes  Were Treatment Team discharge recommendations reviewed with patient/caregiver?: Yes  Did patient/caregiver verbalize understanding of patient care needs?: Yes  Were patient/caregiver advised of the risks associated with not following Treatment Team discharge recommendations?: Yes    Contacts  Relationship to Patient[de-identified] Family  Contact Method: Phone  Reason/Outcome: Continuity of Care, Discharge [address]         Is the patient interested in [address] at discharge?: No    DME Referral Provided  Referral made for DME?: No    Other Referral/Resources/Interventions Provided:  Interventions: SNF    Would you like to participate in our [address] service program?  : No - Declined    Treatment Team Recommendation: SNF  Discharge Destination Plan[de-identified] SNF  Transport at Discharge : BLS Ambulance  Dispatcher Contacted: Yes  Number/Name of Dispatcher: [phone]  Transported by [employer] and Unit #): [facility]  ETA of Transport (Date): 09/27/22  ETA of Transport (Time): 1900  Transport Service Arrived: No  Transfer Mode: Stretcher  Accompanied by: Alone  Transfer Equipment: BLS devices  IMM Given (Date):: 09/27/22  IMM Given to[de-identified] Patient  Family notified[de-identified] Abdulaziz Sandy      Additional Comments: The pt is medically stable for hospital discharge and post acute care planning is finalized  CM spoke with [facility] RN supervisor, Lynette Shaw [phone] whom states the pt is able to return to the facility on this date  BLS transport was arranged and [facility]  is confirmed for 7pm  The pt's sister, Abdulaziz Sandy is aware discharge is scheduled on this date  No further CM intervention is warranted at this time

## 2022-09-27 NOTE — NURSING NOTE
This RN consulted pt on need for bloodwork  Pt refused and demanded to be left alone  Provider Sandra Esteban) notified

## 2022-09-27 NOTE — ASSESSMENT & PLAN NOTE
· Likely due to her prior CVA  · Patient with known history of seizure disorder followed by Dr Maribel Chin with [facility]  · Patient refused seizure medications in the past  · Supposed to be on Keppra 500 mg p o  B i d -but has been refusing  · Neurology is adding  zonisamide 100 mg HS for 1 week and then 200 mg HS for 1 week and 300 mg HS thereafter    Once the zonisamide level is therapeutic can discontinue Keppra

## 2022-09-27 NOTE — DISCHARGE SUMMARY
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[facility])  Assessment & Plan  CT scan finding, there is concern for thrombus or tumor thrombus in the left renal vein extending into the IVC  Suspect secondary to underlying renal mass  Eliquis was changed to b i d  Hypothyroidism  Assessment & Plan  · Continue Synthroid  · Check TSH however patient refused blood work    Hyperlipidemia  Assessment & Plan  Continue statin    Hypertension  Assessment & Plan  · Monitor blood pressure  · Do not appear to be any antihypertensives as outpatient  · Started on lisinopril    Atrial fibrillation (HCC)  Assessment & Plan  · Paroxysmal atrial fibrillation  Anticoagulated with Eliquis  By reviewing the nursing home records patient is only getting Eliquis once a day  · Patient has pacemaker    History of CVA (cerebrovascular accident)  Assessment & Plan  · Left-sided weakness  · Patient reported that she uses wheelchair  · PT OT evaluation recommending rehab    Renal mass  Assessment & Plan  · Patient had a CT abdomen as part of the trauma protocol-CT scan showed 6 cm renal mass   6 cm solid mass at the upper pole of the left kidney worrisome for malignancy  The adjacent upper pole of the left kidney appears abnormal   It is uncertain whether this is due to diminished enhancement possibly from arterial involvement versus tumor infiltration    In addition, there is concern for thrombus or tumor thrombus in the left renal vein extending into the IVC  · Patient refuses urology evaluation  · No decision-making capacity  · Discussed with patient's sister, she is aware of above, okay not to proceed with Urology evaluation    Seizure [facility])  Assessment & Plan  · Likely due to her prior CVA  · Patient with known history of seizure disorder followed by Dr Erma Branch with [facility] Neurology  · Patient refused seizure medications in the past  · Supposed to be on Keppra 500 mg p o  B i d -but has been refusing  · Neurology is adding  zonisamide 100 mg HS for 1 week and then 200 mg HS for 1 week and 300 mg HS thereafter  Once the zonisamide level is therapeutic can discontinue Keppra    * Altered mental status, unspecified  Assessment & Plan  · Patient was transferred to the hospital from [facility] due to altered mental status after a fall 2 days  ago  · Patient with history of seizure disorder  · She was evaluated by trauma  Had CTA head and neck-unremarkable  · Neurology evaluation appreciated  · Patient had a seizure-like activity lasting approximately 5 minutes  and patient was taken to [facility] and she refused to stay and signed AMA  · Evaluated by Neurology with concern of having breakthrough seizure secondary to medication noncompliance  · Started empirically on zonisamide, continue Keppra  · Brain MRI was ordered by Neurology however patient has a pacemaker  · No signs of infection  · Neurology is following, recommend transition from levetiracetem to zonisamide over the next 3 weeks      Medical Problems             Resolved Problems  Date Reviewed: 9/26/2022   None               Discharging Physician / Practitioner: Bebeto Le MD  PCP: Rebel Pollock MD  Admission Date:   Admission Orders (From admission, onward)     Ordered        09/24/22 1445  1 [facility] [place]  Once                      Discharge Date: 09/27/22    Disposition:    Other [facility]    Reason for Admission: [facility]    Discharge Diagnoses:   Please see assessment and plan section above for further details regarding discharge diagnoses       Consultations During Hospital Stay:  · Neurology  · Behavioral health    Procedures Performed:   · None    Significant Findings / Test Results:   CTA head and neck: Left frontal scalp hematoma without intracranial hemorrhage or calvarial fracture    Chronic microangiopathy changes    No cervical or intracranial large vessel occlusion, aneurysm or dissection injury       Moderate right ICA origin stenosis due to atherosclerotic disease    Left occipital sebaceous cyst       CT chest abdomen and pelvis: 1   6 cm solid mass at the upper pole of the left kidney worrisome for malignancy  The adjacent upper pole of the left kidney appears abnormal   It is uncertain whether this is due to diminished enhancement possibly from arterial involvement versus   tumor infiltration  In addition, there is concern for thrombus or tumor thrombus in the left renal vein extending into the IVC  2   3 cm infrarenal abdominal aortic aneurysm  3   Cardiomegaly  Incidental Findings:   · As above     Test Results Pending at Discharge (will require follow up): · None     Outpatient Tests Requested:  · None    Complications:  None    Hospital Course: Leeanna Montanez is a 76 y o  female patient who originally presented to the hospital on 9/24/2022 due to a change in mental status  Patient is a resident of [facility]  Per HPI "Patient is not able to provide reliable history  Patient gets very agitated with questioning and she is recurs in to be discharged back to the facility but was eventually able to convince her to stay in the hospital  Most of the history is obtained from reviewing the chart  It appears that patient had an and administered fall 2 days ago  Patient with ecchymosis and hematoma of left side of the face and periorbital ecchymosis  It appears that the patient refuses to go to the hospital at that time  Since then facility noted that the patient is with altered mental status and was brought to the hospital   Patient was evaluated by trauma and trauma workup was essentially negative    Patient was also evaluated by Neurology  Patient with known history of seizure disorder but not compliant with the medications  Discussed with the patient about the abnormality finding on the CT scan-patient reported that she wants to wait to be evaluated by Urology  Patient gets frustrated very easily"    She was restarted back on antiseizure medication  Neurology ordered brain MRI however patient has a pacemaker  She was seen by neurology and recommended to change from levetiracetem to zonisamide due to her behavioral abnormalities  This was started in the hospital at 100mg daily and will be ramped up over the next three weeks  Levetiracetem can be discontinued when zonisamide levels are therapeutic  Patient has no decision-making capacity as found on behavioral health evaluation  She was also found to have renal vein thrombosis and renal mass on imaging study, does not want Urology evaluation  He sister was notified about this and is agreeable  Her Eliquis was increased to BID and she was discharged back to her SNF        Condition at Discharge: stable    Discharge Day Visit / Exam:   Subjective:  Denies any new complaints  Feels achy and weak  Vitals: Blood Pressure: 145/71 (09/27/22 1506)  Pulse: 74 (09/27/22 1506)  Temperature: 98 4 °F (36 9 °C) (09/27/22 1506)  Temp Source: Tympanic (09/24/22 1209)  Respirations: 16 (09/27/22 0947)  Weight - Scale: 72 kg (158 lb 11 7 oz) (09/24/22 1209)  SpO2: 98 % (09/27/22 1506)  Exam:   Physical Exam  Constitutional:       Appearance: Normal appearance  HENT:      Head: Normocephalic and atraumatic  Nose: Nose normal       Mouth/Throat:      Mouth: Mucous membranes are moist       Pharynx: Oropharynx is clear  Eyes:      Extraocular Movements: Extraocular movements intact  Cardiovascular:      Rate and Rhythm: Normal rate and regular rhythm  Pulmonary:      Effort: Pulmonary effort is normal       Breath sounds: No wheezing or rales  Musculoskeletal:      Right lower leg: No edema  Left lower leg: No edema  Skin:     Findings: Bruising present  Neurological:      General: No focal deficit present  Mental Status: She is alert  She is disoriented  Motor: Weakness present  Psychiatric:         Mood and Affect: Mood normal          Behavior: Behavior normal           Discussion with Family: sister via telephone    Medication Adjustments and Discharge Medications:  · Discharge Medication List: See after visit summary for reconciled discharge medications  · Medication Dosing Tapers - Please refer to Discharge Medication List for details on any medication dosing tapers (if applicable to patient)  · Summary of Medication Adjustments made as a result of this hospitalization: zonisamide added, eliquis increased to BID  · Medications being temporarily held (include recommended restart time): None    Wound Care Recommendations:  When applicable, please see wound care section of After Visit Summary  Instructions for any Catheters / Lines Present at Discharge (including removal date, if applicable): No    Diet Recommendations at Discharge:  Diet -        Diet Orders   (From admission, onward)             Start     Ordered    09/24/22 1655  Diet Regular; Regular House  Diet effective now        Comments: No red meat   References:    Nutrtion Support Algorithm Enteral vs  Parenteral   Question Answer Comment   Diet Type Regular    Regular Regular House    RD to adjust diet per protocol? Yes        09/24/22 1655                Goals of Care Discussions:  · Code Status at Discharge: Level 3 - DNAR and DNI  · POLST completed  Summary of POLST: DNR 3, ok to rehospitalize  See POLST    Discharge instructions/Information to patient and family:   See after visit summary section titled Discharge Instructions for information provided to patient and family  Planned Readmission: No      Discharge Statement:  I spent 45 minutes discharging the patient   This time was spent on the day of discharge  I had direct contact with the patient on the day of discharge  Greater than 50% of the total time was spent examining patient, answering all patient questions, arranging and discussing plan of care with patient as well as directly providing post-discharge instructions  Additional time then spent on discharge activities  **Please Note: This note may have been constructed using a voice recognition system  **

## 2022-09-27 NOTE — ASSESSMENT & PLAN NOTE
· Patient was transferred to the hospital from [facility] due to altered mental status after a fall 2 days  ago  · Patient with history of seizure disorder  · She was evaluated by trauma    Had CTA head and neck-unremarkable  · Neurology evaluation appreciated  · Patient had a seizure-like activity lasting approximately 5 minutes  and patient was taken to [facility] and she refused to stay and signed AMA  · Evaluated by Neurology with concern of having breakthrough seizure secondary to medication noncompliance  · Started empirically on zonisamide, continue Keppra  · Brain MRI was ordered by Neurology however patient has a pacemaker  · No signs of infection  · Neurology is following, recommend transition from levetiracetem to zonisamide over the next 3 weeks

## 2022-09-27 NOTE — CASE MANAGEMENT
Case Management Assessment & Discharge Planning Note    Patient name Aditya Negron  Location [address] 706/PPHP 128-23 MRN [id number]  : 1945 Date 2022       Current Admission Date: 2022  Current Admission Diagnosis:Altered mental status, unspecified   Patient Active Problem List    Diagnosis Date Noted    [facility] 20[facility]    Altered mental status, unspecified 2022    [facility] 2022    Renal mass 2022    History of CVA (cerebrovascular accident) 2022    At[facility] fibrillation ([facility] ) 2022    Hypertension 2022    Hyperlipidemia 2022    Hypothyroidism 2022      LOS (days): 3  Geometric Mean LOS (GMLOS) (days): 2 60  Days to GMLOS:-0 3     OBJECTIVE:    Risk of Unplanned Readmission Score: 7 62         Current admission status: Inpatient  Referral Reason: SNF, Initial    Preferred Pharmacy: No Pharmacies Listed  Primary Care Provider: Waneta Dubin, MD    Primary Insurance: [facility]  Secondary Insurance: [facility]    ASSESSMENT:  Allison 26 Proxies    There are no active Health Care Proxies on file  Readmission Root Cause  30 Day Readmission: No    Patient Information  Admitted from[de-identified] Facility  Mental Status: Alert  During Assessment patient was accompanied by: Not accompanied during assessment  Assessment information provided by[de-identified] Patient  Primary Caregiver: Other (Comment) (SNF staff)  Support Systems: Other (Comment) (Sister)  Type of Current Residence:  (Pt has bedhold at [facility])    Activities of Daily Living Prior to Admission  Functional Status: Total dependent  Completes ADLs independently?: No  Level of ADL dependence: Total Dependent  Ambulates independently?: No  Level of ambulatory dependence: Total Dependent  Does patient use assisted devices?: Yes  Assisted Devices (DME) used:  Wheelchair         Patient Information Continued  Income Source: Pension/senior living  Does patient have prescription coverage?: Yes  Within the past 12 months, you worried that your food would run out before you got the money to buy more : Never true  Within the past 12 months, the food you bought just didn't last and you didn't have money to get more : Never true  Food insecurity resource given?: N/A  Does patient receive dialysis treatments?: No  Does patient have a history of substance abuse?: No  Does patient have a history of Mental Health Diagnosis?: No    PHQ 2/9 Screening   Reviewed PHQ 2/9 Depression Screening Score?: No    Means of Transportation  In the past 12 months, has lack of transportation kept you from medical appointments or from getting medications?: No  In the past 12 months, has lack of transportation kept you from meetings, work, or from getting things needed for daily living?: No  Was application for public transport provided?: N/A        DISCHARGE DETAILS:    Discharge planning discussed with[de-identified] CM spoke with the pt and her sister  Freedom of Choice: Yes  Comments - Freedom of Choice: FOC explored and pt prefers to return to [facility] for continued long term care services    CM contacted family/caregiver?: Yes  Were Treatment Team discharge recommendations reviewed with patient/caregiver?: Yes  Did patient/caregiver verbalize understanding of patient care needs?: Yes  Were patient/caregiver advised of the risks associated with not following Treatment Team discharge recommendations?: Yes    Contacts  Patient Contacts: sister Beyer  Relationship to Patient[de-identified] Family  Contact Method: Phone  Phone Number: [phone]  Reason/Outcome: Continuity of Care, Discharge [address]         Is the patient interested in [facility] at discharge?: No    DME Referral Provided  Referral made for DME?: No    Other Referral/Resources/Interventions Provided:  Interventions: SNF  Referral Comments: PT re-referred to [facility]    Would you like to participate in our [address] service program?  : No - Declined    Treatment Team Recommendation: SNF  Discharge Destination Plan[de-identified] SNF  Transport at Discharge : BLS Ambulance        Additional Comments:     Pt experiencing her first [address] [facility]  Of note the pt is adm from [facility]  CM confirmed with the adm office that the pt receives long term care and has bed hold  Per pt, once stable for d/c she prefers to return to [facility], and her sister Santana Noonan is also in agreement with the pt returning

## 2022-09-28 NOTE — UTILIZATION REVIEW
Notification of Discharge   This is a Notification of Discharge from our facility [address]  Please be advised that this patient has been discharge from our facility  Below you will find the admission and discharge date and time including the patients disposition  UTILIZATION REVIEW CONTACT:  Sandy Camacho  Utilization   Network Utilization Review Department  Phone: [phone] x carefully listen to the prompts  All voicemails are confidential   Email: [email or web address]     PHYSICIAN ADVISORY SERVICES:  FOR TFTE-OU-FAPC REVIEW - MEDICAL NECESSITY DENIAL  Phone: [phone]  Fax: [phone]  Email: [email or web address]     PRESENTATION DATE: 9/24/2022 12:10 PM  OBERVATION ADMISSION DATE:   INPATIENT ADMISSION DATE: 9/24/22  2:45 PM   DISCHARGE DATE: 9/27/2022  8:00 PM  DISPOSITION: Home/Self Care Home/Self Care      IMPORTANT INFORMATION:  Send all requests for admission clinical reviews, approved or denied determinations and any other requests to dedicated fax number below belonging to the campus where the patient is receiving treatment   List of dedicated fax numbers:  [address] DENIALS (Administrative/Medical Necessity) [phone]   [address] [place] [address] (Maternity/NICU/Pediatrics) [phone]   Rosalia Amezcua [phone]   130 [facility] [phone]   [address] [place] [address] [phone]   2000 [facility] [address] [place] [address],[address]Th Floor [address] [place] [address] [phone]   [facility]  [phone]   [address] [place] [address], [address]  2401 [facility] 1000 [address] [facility] [phone]

## 2023-06-16 ENCOUNTER — HOSPITAL ENCOUNTER (EMERGENCY)
Facility: HOSPITAL | Age: 78
Discharge: HOME | End: 2023-06-16
Attending: EMERGENCY MEDICINE
Payer: COMMERCIAL

## 2023-06-16 ENCOUNTER — OFFICE VISIT (OUTPATIENT)
Dept: NEUROLOGY | Facility: CLINIC | Age: 78
End: 2023-06-16
Payer: COMMERCIAL

## 2023-06-16 VITALS
HEART RATE: 70 BPM | DIASTOLIC BLOOD PRESSURE: 72 MMHG | SYSTOLIC BLOOD PRESSURE: 156 MMHG | OXYGEN SATURATION: 99 % | RESPIRATION RATE: 16 BRPM

## 2023-06-16 DIAGNOSIS — R56.9 SEIZURE (HCC): Primary | ICD-10-CM

## 2023-06-16 DIAGNOSIS — F32.A DEPRESSION: Primary | ICD-10-CM

## 2023-06-16 PROCEDURE — 99213 OFFICE O/P EST LOW 20 MIN: CPT | Performed by: NURSE PRACTITIONER

## 2023-06-16 PROCEDURE — 99282 EMERGENCY DEPT VISIT SF MDM: CPT

## 2023-06-16 NOTE — DISCHARGE INSTRUCTIONS
PATIENT WILL NEED EVALUATION AND TREATMENT BY PSYCHIATRY OR PRIMARY CARE IN THE OUTPATIENT SETTING  ADDITIONAL RESOURCES HAVE BEEN PROVIDED  Continue to monitor symptoms and complaints including emotional and physical     Please return to the Emergency Department if you experience worsening of your current symptoms, thoughts of harming yourself or anyone else, or any other concerning symptoms

## 2023-06-16 NOTE — ASSESSMENT & PLAN NOTE
No seizures since her hospitalization per the patient  From review of MAR she is taking keppra 500 mg BID at facility  Not on zonisamide per MAR  No changes will be made to medications at this time given other more pressing issues as noted below  Due to patient yelling about back pain, head pain from a fall, nausea/dry heaving, suicidal ideation, and requesting to go to the ER, she was sent to the ER for evaluation  She is making complaints about the condition of and treatment practices at [facility], police who were here when 911 was called will be making a report  Asked patient if she would like for any of her family members to be notified of the situation of her being sent to the ER but she reports that she does not want anyone called  Facility made aware that patient was sent to the ER  Patient can follow up regarding her seizures in 6 months with Dr Nayan Nava or sooner if needed  If there are breakthrough seizures in the meantime, recommend facility call the office

## 2023-06-16 NOTE — ED ATTENDING ATTESTATION
6/16/2023  IEliz MD, saw and evaluated the patient  I have discussed the patient with the resident/non-physician practitioner and agree with the resident's/non-physician practitioner's findings, Plan of Care, and MDM as documented in the resident's/non-physician practitioner's note, except where noted  All available labs and Radiology studies were reviewed  I was present for key portions of any procedure(s) performed by the resident/non-physician practitioner and I was immediately available to provide assistance  At this point I agree with the current assessment done in the Emergency Department  I have conducted an independent evaluation of this patient a history and physical is as follows:    ED Course     64-year-old female presenting to the emergency department from the neurologist office for evaluation of multiple complaints  Patient had followed up with neurology as an outpatient for history of seizures  While there she made comments about the quality of her care at her assisted living facility that included statements that the neurologist had thought were concerning for suicidality  Patient apparently stated that the care was so bad that if she had a gun she would shoot herself  The patient states that this was just an offhand comment and she does not have suicidality and would not actually kill herself  Patient's secondary complaint is left eyelid pain which has been present since an injury that occurred in September 2022  This is not changed significantly acutely  Patient's third complaint is some right-sided back pain which has been present since before Christmas  No bowel or bladder incontinence  No saddle anesthesia  No difficulty with ambulation  The patient is resting comfortably on a stretcher in no acute respiratory distress  The patient appears nontoxic  HEENT reveals moist mucous membranes  Head is normocephalic and atraumatic   Conjunctiva and sclera are normal  Neck is nontender and supple with full range of motion to flexion, extension, lateral rotation  No meningismus appreciated  No masses are appreciated  Lungs are clear to auscultation bilaterally without any wheezes, rales or rhonchi  Heart is regular rate and rhythm without any murmurs, rubs or gallops  Abdomen is soft and nontender without any rebound or guarding  Extremities appear grossly normal without any significant arthropathy  Reproducible tenderness to palpation in the right paraspinal musculature  No step-offs  No masses  Patient is awake, alert, and oriented x2  The patient has normal interaction  Cranial nerves grossly intact  Motor is 5 out of 5 bilateral upper and lower extremities  Patient denies suicidal and homicidal ideation  MEDICAL DECISION MAKING    Number and Complexity of Problems  • Differential diagnosis: Patient is disgruntled but denies suicidality  No grounds to 302 the patient  Left eyelid appears normal     Medical Decision Making Data  • External documents reviewed: Reviewed neurology office visit note from today  No orders to display       Labs Reviewed - No data to display      • Discussed case with: ED crisis worker  Patient was seen by ED crisis worker who verified that the patient would have ability to have outpatient psychiatric services at her facility  Remainder of complaints can follow-up with primary care physician  Treatment and Disposition  ED course: Patient was given a sandwich in the emergency department and a cup of black tea  Patient remained interactive without suicidality  Shared decision making: Patient is agreeable to discharge back to her assisted living facility  Code status: Full code                Critical Care Time  Procedures

## 2023-06-16 NOTE — PROGRESS NOTES
Patient ID: Do Moore is a 76 y o  female with a PMH of ischemic stroke with residual left sided weakness, HLD, HTN, afib on eliquis and hypothyroidism presenting to the [facility] for follow up of seizures  Assessment/Plan:    Seizure ([address] )  No seizures since her hospitalization per the patient  From review of MAR she is taking keppra 500 mg BID at facility  Not on zonisamide per MAR  No changes will be made to medications at this time given other more pressing issues as noted below  Due to patient yelling about back pain, head pain from a fall, nausea/dry heaving, suicidal ideation, and requesting to go to the ER, she was sent to the ER for evaluation  She is making complaints about the condition of and treatment practices at [facility], police who were here when 911 was called will be making a report  Asked patient if she would like for any of her family members to be notified of the situation of her being sent to the ER but she reports that she does not want anyone called  Facility made aware that patient was sent to the ER  Patient can follow up regarding her seizures in 6 months with Dr Yolanda Serrato or sooner if needed  If there are breakthrough seizures in the meantime, recommend facility call the office  Subjective: Do Moore is a 76 y o  female with a PMH of ischemic stroke with residual left sided weakness, HLD, HTN, afib on eliquis and hypothyroidism presenting to the [facility] for follow up of seizures  She was last seen in the office by Dr Yolanda Serrato on 6/2/2022 for initial consultation  It was noted that she was not a reliable historian, living at [facility] for the three years since her first stroke  Paperwork from home noted that she was on Depakote and keppra in the past but was unclear what happened with those medications   It was noted that there was enough documentation in the EMR that she likely had two epileptic seizures in the prior 6 months, presumed from her old stroke  She was agreeable to starting levetiracetam but was not agreeable to having further testing including MRI and EEG at that time  Recommended levetiracetam 500 mg BID and follow up in 6 months  Unfortunately she was lost to follow up  The patient was hospitalized 9/24-9/27/2022 due to altered mental status s/p fall  It was noted that she had seizure like activity for approx5 minutes and was taken to [facility] and refused to stay and signed out AMA  Neurology was concerned about breakthrough seizure given she had been refusing to take levetiracetam 500 mg BID  IT was recommended that she start on zonisamide to goal dose of 300 mg HS  No MRI done at that time due to patient having a pacemaker  It is important to note that alan was found to have no decision making capacity with behavioral health evaluation  Eliquis also increased to BID during this admission due to 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  The patient presents to her appointment today by herself  She is in a wheelchair and was brought to the office by transport  She reports that her diaper is full and she does not want to be here  She reports that she is starving and she refused to answer questions for medical assistant  She reports terrible back pain and significant nausea/ dry heaving  She reports that she hurt her back during transport  She also is asking someone to get a gun  She would like to go to the ER  She is yelling in pain due to back pain, abdominal pain  She does appear to be pale  She does not believe that she has had any seizures recently  She is taking keppra 500 mg bid  Zonisamide is not on her medication list      Current seizure medications:  - keppra 500 mg BID  Other medications as per Epic  Special Features  Age/Date of seizure onset: 12/7/2021?   Status epilepticus: no  Self Injury Seizures: yes - Head laceration  Precipitating Factors: N/A     Epilepsy Risk Factors:  Stroke     Current AEDs:  None     Prior AEDs:  None     Prior Evaluation:  [facility] 3/10/2022: No intracranial hemorrhage     History Reviewed: The following were reviewed and updated as appropriate: allergies, current medications, past family history, past medical history, past social history, past surgical history and problem list     Psychiatric History:  Denies any mental health     Social History:   Driving: No  Lives Alone: No  Occupation: retired     I reviewed prior neurology notes, inpatient documentation and prior imaging reports, as documented in Epic/[employer], and summarized above  Objective: There were no vitals taken for this visit  Patient is not cooperative with exam  Yelling and crying, dry heaving during visit  ROS:  Review of Systems   Constitutional: Negative  Negative for appetite change and fever  HENT: Negative  Negative for hearing loss, tinnitus, trouble swallowing and voice change  Eyes: Negative  Negative for photophobia, pain and visual disturbance  Respiratory: Negative  Negative for shortness of breath  Cardiovascular: Negative  Negative for palpitations  Gastrointestinal: Negative  Negative for nausea and vomiting  Endocrine: Negative  Negative for cold intolerance  Genitourinary: Negative  Negative for dysuria, frequency and urgency  Musculoskeletal: Negative  Negative for gait problem, myalgias and neck pain  Skin: Negative  Negative for rash  Allergic/Immunologic: Negative  Neurological: Negative  Negative for dizziness, tremors, seizures, syncope, facial asymmetry, speech difficulty, weakness, light-headedness, numbness and headaches  Hematological: Negative  Does not bruise/bleed easily  Psychiatric/Behavioral: Negative  Negative for confusion, hallucinations and sleep disturbance     All other systems reviewed and are negative  ROS obtained by MA and reviewed by myself  This note may have been created using voice recognition software  There may be unintentional errors such as grammatical errors, spelling errors, or pronoun errors

## 2023-06-17 NOTE — ED PROVIDER NOTES
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  EMS reports that pt states pt reported the same and that the doctors office she was at reported the same and also that she wanted a gun  Pt denies a problem at all and that she is ok to go back to SNF  Police at bedside also      HPI   Patient is a 75-year-old female who was sent to the ED for evaluation of multiple complaints  Patient was seen in neurology office as patient for follow-up appointment regarding her seizures, while at appointment patient made comments with suicidal ideation and was sent to the ED for evaluation  Patient states she is unhappy with her care at her SNF and stated that if she had a gun she would shoot herself  Patient denies any SI, HI, or hallucinations  Patient states this is a, she makes frequently due to the poor quality of her care and frustration with living in the facility  Patient also reports left eyebrow pain that she has had for the past 9 months after a fall with injury  Patient states that this pain is consistent and has not changed in quality  Patient also states that she has right-sided back pain which she has also had for several months due to the  driving to dangerous and aggressively  Patient denies any fevers, chills, chest pain, shortness of breath, changes in vision or hearing, abdominal pain, nausea, vomiting, diarrhea, unilateral leg swelling, dysuria, hematuria, recent travel, chemotherapy, saddle anesthesia, falls or trauma  Prior to Admission Medications   Prescriptions Last Dose Informant Patient Reported? Taking?    Eddie-Gest Antacid 500 MG chewable tablet   Yes No   Cholecalciferol (Vitamin D3) 50 MCG (2000 UT) TABS   Yes No   Magnesium Hydroxide (DULCOLAX PO)   Yes No   Sig: Take 10 mg by mouth   Multiple Vitamins-Minerals (multivitamin with minerals) tablet   Yes No   Sig: Take 1 tablet by mouth daily   acetaminophen (TYLENOL) 325 mg tablet  Outside Facility (Specify) Yes No   Sig: Take 325 mg by mouth every 6 (six) hours as needed for mild pain   acetaminophen (TYLENOL) 325 mg tablet   Yes No   Sig: Take 650 mg by mouth every 6 (six) hours as needed for mild pain, headaches or fever   albuterol (PROVENTIL HFA,VENTOLIN HFA) 90 mcg/act inhaler   Yes No   Sig: Inhale 2 puffs every 6 (six) hours as needed for wheezing   apixaban (ELIQUIS) 5 mg  Outside Facility (Specify) Yes No   Sig: Take 5 mg by mouth 2 (two) times a day   apixaban (Eliquis) 5 mg   No No   Sig: Take 1 tablet (5 mg total) by mouth 2 (two) times a day   atorvastatin (LIPITOR) 40 mg tablet  Outside Facility (Specify) Yes No   Sig: Take 40 mg by mouth daily   Patient not taking: Reported on 6/2/2022   bisacodyl (Dulcolax) 10 mg suppository   Yes No   Sig: Insert 10 mg into the rectum daily as needed for constipation   calcium carbonate (TUMS) 500 mg chewable tablet   Yes No   Sig: Chew 1 tablet daily as needed for indigestion or heartburn   guaiFENesin (ROBITUSSIN) 100 MG/5ML oral liquid   Yes No   Sig: Take 200 mg by mouth 3 (three) times a day as needed for cough   hydrocortisone 1 % cream   Yes No   levETIRAcetam (KEPPRA) 500 mg tablet   Yes No   Sig: Take 500 mg by mouth every 12 (twelve) hours   levothyroxine 150 mcg tablet  Outside Facility (Specify) Yes No   Sig: Take 150 mcg by mouth daily   Patient not taking: Reported on 6/2/2022   levothyroxine 25 mcg tablet   Yes No   Sig: Take 25 mcg by mouth daily   levothyroxine 75 mcg tablet   Yes No   lisinopril (ZESTRIL) 5 mg tablet   No No   Sig: Take 1 tablet (5 mg total) by mouth daily Do not start before September 28, 2022     lovastatin (MEVACOR) 20 mg tablet   Yes No   lovastatin (MEVACOR) 20 mg tablet   Yes No   Sig: Take 20 mg by mouth daily at bedtime   magnesium hydroxide (MILK OF MAGNESIA) 400 mg/5 mL oral suspension   Yes No   Sig: Take by mouth daily as needed for constipation   magnesium hydroxide (MILK OF MAGNESIA) 400 mg/5 mL oral suspension   Yes No   Sig: Take 30 mL by mouth daily as needed for constipation   nicotine (NICODERM CQ) 14 mg/24hr TD 24 hr patch  Outside Facility (Specify) Yes No   Sig: Place 1 patch on the skin every 24 hours   Patient not taking: Reported on 6/2/2022   nystatin (MYCOSTATIN) powder   No No   Sig: Apply topically 2 (two) times a day   senna (SENOKOT) 8 6 MG tablet   Yes No   Sig: Take 1 tablet by mouth daily   senna (SENOKOT) 8 6 MG tablet   Yes No   Sig: Take 2 tablets by mouth 2 (two) times a day   sodium phosphate (FLEET) 7-19 G 118 mL enema   Yes No   Sig: Insert 1 enema into the rectum daily as needed   sodium phosphate-biphosphate (FLEET) 7-19 g 118 mL enema   Yes No   Sig: Insert 1 enema into the rectum   traMADol (ULTRAM) 50 mg tablet  Outside Facility (Specify) Yes No   Sig: Take 50 mg by mouth every 6 (six) hours as needed for moderate pain      Facility-Administered Medications: None       History reviewed  No pertinent past medical history  History reviewed  No pertinent surgical history  History reviewed  No pertinent family history  I have reviewed and agree with the history as documented  E-Cigarette/Vaping   • E-Cigarette Use Never User      E-Cigarette/Vaping Substances   • Nicotine No    • THC No    • CBD No    • Flavoring No    • Other No    • Unknown No      Social History     Tobacco Use   • Smoking status: Never   • Smokeless tobacco: Never   Vaping Use   • Vaping Use: Never used   Substance Use Topics   • Alcohol use: Not Currently   • Drug use: Never        Review of Systems   Constitutional: Negative for chills and fever  HENT: Negative for congestion and sore throat  Left eyebrow/orbit pain (chronic)   Eyes: Negative for pain and visual disturbance     Respiratory: Negative for cough and shortness of breath  Cardiovascular: Negative for chest pain and palpitations  Gastrointestinal: Negative for abdominal pain, diarrhea, nausea and vomiting  Genitourinary: Negative for dysuria and hematuria  Musculoskeletal: Positive for back pain (Chronic)  Negative for myalgias  Skin: Negative for color change and rash  Neurological: Negative for dizziness and headaches  All other systems reviewed and are negative  Physical Exam  ED Triage Vitals [06/16/23 1434]   Temp Pulse Respirations Blood Pressure SpO2   -- 72 16 166/73 100 %      Temp src Heart Rate Source Patient Position - Orthostatic VS BP Location FiO2 (%)   -- -- -- -- --      Pain Score       No Pain             Orthostatic Vital Signs  Vitals:    06/16/23 1434 06/16/23 1600   BP: 166/73 156/72   Pulse: 72 70       Physical Exam  Vitals and nursing note reviewed  Constitutional:       General: She is not in acute distress  HENT:      Head: Normocephalic and atraumatic  Comments: Left knee medial and lateral eyebrow tenderness     Nose: No congestion or rhinorrhea  Mouth/Throat:      Mouth: Mucous membranes are moist    Eyes:      General: No scleral icterus  Extraocular Movements: Extraocular movements intact  Conjunctiva/sclera: Conjunctivae normal    Cardiovascular:      Rate and Rhythm: Normal rate and regular rhythm  Pulses: Normal pulses  Heart sounds: Normal heart sounds  No murmur heard  Pulmonary:      Effort: Pulmonary effort is normal  No respiratory distress  Breath sounds: Normal breath sounds  No wheezing, rhonchi or rales  Abdominal:      General: There is no distension  Tenderness: There is no abdominal tenderness  There is no guarding or rebound  Musculoskeletal:         General: No deformity or signs of injury  Normal range of motion  Cervical back: Normal range of motion and neck supple  Lumbar back: Tenderness (Right paraspinal) present   No bony tenderness  Skin:     General: Skin is warm  Capillary Refill: Capillary refill takes less than 2 seconds  Coloration: Skin is not pale  Findings: No bruising  Neurological:      General: No focal deficit present  Mental Status: She is alert  Mental status is at baseline  Psychiatric:         Mood and Affect: Mood normal          Behavior: Behavior normal  Behavior is cooperative  Thought Content: Thought content does not include homicidal or suicidal ideation  Thought content does not include suicidal plan  ED Medications  Medications - No data to display    Diagnostic Studies  Results Reviewed     None                 No orders to display         Procedures  Procedures      ED Course  ED Course as of 06/17/23 1631   Fri Jun 16, 2023   1524 Spoke with ED crisis worker who will see patient and bring outpatient resources  Identification of Seniors at [address] [place] [address] Most Recent Value   (ISAR) Identification of Seniors at Risk    Before the illness or injury that brought you to the Emergency, did you need someone to help you on a regular basis? 1 Filed at: 06/16/2023 1435   In the last 24 hours, have you needed more help than usual? 0 Filed at: 06/16/2023 1435   Have you been hospitalized for one or more nights during the past 6 months? 0 Filed at: 06/16/2023 1435   In general, do you see well? 0 Filed at: 06/16/2023 1435   In general, do you have serious problems with your memory? 0 Filed at: 06/16/2023 1435   Do you take more than three different medications every day? 1 Filed at: 06/16/2023 1435   ISAR Score 2 Filed at: 06/16/2023 1435                              Medical Decision Making  Depression: acute illness or injury  Amount and/or Complexity of Data Reviewed  Independent Historian: EMS      Risk  OTC drugs  Prescription drug management  Decision regarding hospitalization          Patient is a 27-year-old female who presents to the ED for evaluation of multiple complaints  Differential diagnose includes not limited to: Exacerbation of chronic lower back pain, emotional dysregulation, dementia  See ED course for additional details  Per ED crisis, patient will be able to be seen by psychiatrist at facility during monthly visit  Patient provided outpatient resources and given information discharge instructions  Discussed results and plan with patient  Advised on need for outpatient follow up, given information  Given return precautions verbally and in discharge instructions  All questions answered  Patient expressed verbal understanding and is agreeable with plan for discharge with outpatient follow up  Disposition  Final diagnoses:   Depression     Time reflects when diagnosis was documented in both MDM as applicable and the Disposition within this note     Time User Action Codes Description Comment    6/16/2023  3:39 PM Mechelle Cintron Add Joshua Mac  A] Depression       ED Disposition     ED Disposition   Discharge    Condition   Stable    Date/Time   Fri Jun 16, 2023 [address] discharge to SNF care  Follow-up Information     Follow up With Specialties Details Why Contact Info Additional Information    [address] [facility] [address] [place] [id number]  3 [facility], [address] [place], [place], [address]  Please contact your PCP for any scheduling issues       [address] Emergency Department Emergency Medicine Go to  If symptoms worsen [address] [phone]  1 [facility] Emergency Department, [address], [place] , [place], [address]          Discharge Medication List as of 6/16/2023  3:41 PM      CONTINUE these medications which have NOT CHANGED    Details   !! acetaminophen (TYLENOL) 325 mg tablet Take 325 mg by mouth every 6 (six) hours as needed for mild pain, Historical Med      !! acetaminophen (TYLENOL) 325 mg tablet Take 650 mg by mouth every 6 (six) hours as needed for mild pain, headaches or fever, Historical Med      albuterol (PROVENTIL HFA,VENTOLIN HFA) 90 mcg/act inhaler Inhale 2 puffs every 6 (six) hours as needed for wheezing, Historical Med      !! apixaban (ELIQUIS) 5 mg Take 5 mg by mouth 2 (two) times a day, Historical Med      !! apixaban (Eliquis) 5 mg Take 1 tablet (5 mg total) by mouth 2 (two) times a day, Starting Tue 9/27/2022, No Print      atorvastatin (LIPITOR) 40 mg tablet Take 40 mg by mouth daily, Historical Med      bisacodyl (Dulcolax) 10 mg suppository Insert 10 mg into the rectum daily as needed for constipation, Historical Med      !! Eddie-Gest Antacid 500 MG chewable tablet Starting Sat 4/16/2022, Historical Med      !! calcium carbonate (TUMS) 500 mg chewable tablet Chew 1 tablet daily as needed for indigestion or heartburn, Historical Med      Cholecalciferol (Vitamin D3) 50 MCG (2000 UT) TABS Starting Thu 5/26/2022, Historical Med      guaiFENesin (ROBITUSSIN) 100 MG/5ML oral liquid Take 200 mg by mouth 3 (three) times a day as needed for cough, Historical Med      hydrocortisone 1 % cream Starting Mon 5/23/2022, Historical Med      levETIRAcetam (KEPPRA) 500 mg tablet Take 500 mg by mouth every 12 (twelve) hours, Historical Med      !! levothyroxine 150 mcg tablet Take 150 mcg by mouth daily, Historical Med      !! levothyroxine 25 mcg tablet Take 25 mcg by mouth daily, Historical Med      !! levothyroxine 75 mcg tablet Starting Tue 5/3/2022, Historical Med      lisinopril (ZESTRIL) 5 mg tablet Take 1 tablet (5 mg total) by mouth daily Do not start before September 28, 2022 , Starting Wed 9/28/2022, No Print      !! lovastatin (MEVACOR) 20 mg tablet Starting Wed 5/11/2022, Historical Med      !! lovastatin (MEVACOR) 20 mg tablet Take 20 mg by mouth daily at bedtime, Historical Med      !! Magnesium Hydroxide (DULCOLAX PO) Take 10 mg by mouth, Historical Med      !! magnesium hydroxide (MILK OF MAGNESIA) 400 mg/5 mL oral suspension Take by mouth daily as needed for constipation, Historical Med      !! magnesium hydroxide (MILK OF MAGNESIA) 400 mg/5 mL oral suspension Take 30 mL by mouth daily as needed for constipation, Historical Med      Multiple Vitamins-Minerals (multivitamin with minerals) tablet Take 1 tablet by mouth daily, Historical Med      nicotine (NICODERM CQ) 14 mg/24hr TD 24 hr patch Place 1 patch on the skin every 24 hours, Historical Med      nystatin (MYCOSTATIN) powder Apply topically 2 (two) times a day, Starting Tue 9/27/2022, No Print      !! senna (SENOKOT) 8 6 MG tablet Take 1 tablet by mouth daily, Historical Med      !! senna (SENOKOT) 8 6 MG tablet Take 2 tablets by mouth 2 (two) times a day, Historical Med      !! sodium phosphate (FLEET) 7-19 G 118 mL enema Insert 1 enema into the rectum daily as needed, Historical Med      !! sodium phosphate-biphosphate (FLEET) 7-19 g 118 mL enema Insert 1 enema into the rectum, Historical Med      traMADol (ULTRAM) 50 mg tablet Take 50 mg by mouth every 6 (six) hours as needed for moderate pain, Historical Med       !! - Potential duplicate medications found  Please discuss with provider  No discharge procedures on file  PDMP Review     None           ED Provider  Attending physically available and evaluated Krissy Sidney  I managed the patient along with the ED Attending      Electronically Signed by         Gena Chun DO  06/17/23 [id number]

## 2024-05-06 NOTE — PATIENT INSTRUCTIONS
- continue current dose of levetiracetam  - call the office with breakthrough seizures  - follow up in 6 months
06-May-2024 01:00